# Patient Record
Sex: FEMALE | Race: BLACK OR AFRICAN AMERICAN | NOT HISPANIC OR LATINO | Employment: FULL TIME | URBAN - METROPOLITAN AREA
[De-identification: names, ages, dates, MRNs, and addresses within clinical notes are randomized per-mention and may not be internally consistent; named-entity substitution may affect disease eponyms.]

---

## 2024-11-19 ENCOUNTER — HOSPITAL ENCOUNTER (OUTPATIENT)
Dept: GENERAL RADIOLOGY | Facility: HOSPITAL | Age: 59
Discharge: HOME | End: 2024-11-19
Payer: COMMERCIAL

## 2024-11-19 ENCOUNTER — OFFICE VISIT (OUTPATIENT)
Dept: FAMILY MEDICINE CLINIC | Facility: CLINIC | Age: 59
End: 2024-11-19
Payer: COMMERCIAL

## 2024-11-19 ENCOUNTER — LAB (OUTPATIENT)
Dept: LAB | Facility: HOSPITAL | Age: 59
End: 2024-11-19
Payer: COMMERCIAL

## 2024-11-19 VITALS
OXYGEN SATURATION: 99 % | BODY MASS INDEX: 22.18 KG/M2 | WEIGHT: 138 LBS | HEIGHT: 66 IN | SYSTOLIC BLOOD PRESSURE: 132 MMHG | TEMPERATURE: 98 F | HEART RATE: 85 BPM | DIASTOLIC BLOOD PRESSURE: 88 MMHG

## 2024-11-19 DIAGNOSIS — Z00.00 ENCOUNTER FOR ANNUAL PHYSICAL EXAM: Primary | ICD-10-CM

## 2024-11-19 DIAGNOSIS — R63.4 UNEXPLAINED WEIGHT LOSS: ICD-10-CM

## 2024-11-19 DIAGNOSIS — M54.42 CHRONIC BILATERAL LOW BACK PAIN WITH BILATERAL SCIATICA: ICD-10-CM

## 2024-11-19 DIAGNOSIS — Z01.419 WELL WOMAN EXAM: ICD-10-CM

## 2024-11-19 DIAGNOSIS — G89.29 CHRONIC BILATERAL LOW BACK PAIN WITH BILATERAL SCIATICA: ICD-10-CM

## 2024-11-19 DIAGNOSIS — M54.41 CHRONIC BILATERAL LOW BACK PAIN WITH BILATERAL SCIATICA: ICD-10-CM

## 2024-11-19 DIAGNOSIS — Z11.59 NEED FOR HEPATITIS C SCREENING TEST: ICD-10-CM

## 2024-11-19 DIAGNOSIS — M25.552 LEFT HIP PAIN: ICD-10-CM

## 2024-11-19 DIAGNOSIS — K21.9 GASTROESOPHAGEAL REFLUX DISEASE, UNSPECIFIED WHETHER ESOPHAGITIS PRESENT: ICD-10-CM

## 2024-11-19 DIAGNOSIS — D64.9 ANEMIA, UNSPECIFIED TYPE: ICD-10-CM

## 2024-11-19 DIAGNOSIS — Z00.00 ENCOUNTER FOR ANNUAL PHYSICAL EXAM: ICD-10-CM

## 2024-11-19 DIAGNOSIS — Z12.31 ENCOUNTER FOR SCREENING MAMMOGRAM FOR MALIGNANT NEOPLASM OF BREAST: ICD-10-CM

## 2024-11-19 LAB
25(OH)D3 SERPL-MCNC: 15.6 NG/ML (ref 30–100)
BASOPHILS # BLD AUTO: 0.05 10*3/MM3 (ref 0–0.2)
BASOPHILS NFR BLD AUTO: 0.6 % (ref 0–1.5)
DEPRECATED RDW RBC AUTO: 39 FL (ref 37–54)
EOSINOPHIL # BLD AUTO: 0.2 10*3/MM3 (ref 0–0.4)
EOSINOPHIL NFR BLD AUTO: 2.3 % (ref 0.3–6.2)
ERYTHROCYTE [DISTWIDTH] IN BLOOD BY AUTOMATED COUNT: 12 % (ref 12.3–15.4)
ERYTHROCYTE [SEDIMENTATION RATE] IN BLOOD: 1 MM/HR (ref 0–30)
HBA1C MFR BLD: 7 % (ref 4.8–5.6)
HCT VFR BLD AUTO: 33.3 % (ref 34–46.6)
HCV AB SER QL: NORMAL
HGB BLD-MCNC: 11 G/DL (ref 12–15.9)
HIV 1+2 AB+HIV1 P24 AG SERPL QL IA: NORMAL
IMM GRANULOCYTES # BLD AUTO: 0.04 10*3/MM3 (ref 0–0.05)
IMM GRANULOCYTES NFR BLD AUTO: 0.5 % (ref 0–0.5)
LYMPHOCYTES # BLD AUTO: 2.66 10*3/MM3 (ref 0.7–3.1)
LYMPHOCYTES NFR BLD AUTO: 30.3 % (ref 19.6–45.3)
MCH RBC QN AUTO: 29.5 PG (ref 26.6–33)
MCHC RBC AUTO-ENTMCNC: 33 G/DL (ref 31.5–35.7)
MCV RBC AUTO: 89.3 FL (ref 79–97)
MONOCYTES # BLD AUTO: 0.39 10*3/MM3 (ref 0.1–0.9)
MONOCYTES NFR BLD AUTO: 4.4 % (ref 5–12)
NEUTROPHILS NFR BLD AUTO: 5.44 10*3/MM3 (ref 1.7–7)
NEUTROPHILS NFR BLD AUTO: 61.9 % (ref 42.7–76)
NRBC BLD AUTO-RTO: 0 /100 WBC (ref 0–0.2)
PLATELET # BLD AUTO: 220 10*3/MM3 (ref 140–450)
PMV BLD AUTO: 11.5 FL (ref 6–12)
RBC # BLD AUTO: 3.73 10*6/MM3 (ref 3.77–5.28)
VIT B12 BLD-MCNC: 366 PG/ML (ref 211–946)
WBC NRBC COR # BLD AUTO: 8.78 10*3/MM3 (ref 3.4–10.8)

## 2024-11-19 PROCEDURE — G0432 EIA HIV-1/HIV-2 SCREEN: HCPCS

## 2024-11-19 PROCEDURE — 80061 LIPID PANEL: CPT

## 2024-11-19 PROCEDURE — 83615 LACTATE (LD) (LDH) ENZYME: CPT

## 2024-11-19 PROCEDURE — 36415 COLL VENOUS BLD VENIPUNCTURE: CPT

## 2024-11-19 PROCEDURE — 82607 VITAMIN B-12: CPT

## 2024-11-19 PROCEDURE — 99386 PREV VISIT NEW AGE 40-64: CPT | Performed by: FAMILY MEDICINE

## 2024-11-19 PROCEDURE — 83013 H PYLORI (C-13) BREATH: CPT

## 2024-11-19 PROCEDURE — 82746 ASSAY OF FOLIC ACID SERUM: CPT

## 2024-11-19 PROCEDURE — 73502 X-RAY EXAM HIP UNI 2-3 VIEWS: CPT

## 2024-11-19 PROCEDURE — 86803 HEPATITIS C AB TEST: CPT

## 2024-11-19 PROCEDURE — 86140 C-REACTIVE PROTEIN: CPT

## 2024-11-19 PROCEDURE — 83540 ASSAY OF IRON: CPT

## 2024-11-19 PROCEDURE — 85652 RBC SED RATE AUTOMATED: CPT

## 2024-11-19 PROCEDURE — 84466 ASSAY OF TRANSFERRIN: CPT

## 2024-11-19 PROCEDURE — 82728 ASSAY OF FERRITIN: CPT

## 2024-11-19 PROCEDURE — 82306 VITAMIN D 25 HYDROXY: CPT

## 2024-11-19 PROCEDURE — 83036 HEMOGLOBIN GLYCOSYLATED A1C: CPT

## 2024-11-19 PROCEDURE — 80050 GENERAL HEALTH PANEL: CPT

## 2024-11-19 PROCEDURE — 99214 OFFICE O/P EST MOD 30 MIN: CPT | Performed by: FAMILY MEDICINE

## 2024-11-19 PROCEDURE — 72100 X-RAY EXAM L-S SPINE 2/3 VWS: CPT

## 2024-11-19 RX ORDER — L. ACIDOPHILUS/BIFID. ANIMALIS 2B CELL
CAPSULE ORAL
COMMUNITY

## 2024-11-19 RX ORDER — DOCUSATE SODIUM 100 MG/1
100 CAPSULE, LIQUID FILLED ORAL 2 TIMES DAILY
COMMUNITY

## 2024-11-19 NOTE — ASSESSMENT & PLAN NOTE
Annual wellness exam completed today. Health Maintenance including immunizations was updated and reflected in the chart. Yearly screening labs were ordered.     Further recommendations to be given once lab data received.     Health advice: healthy food choices with fresh fruits and vegetables, maintain sleep pattern at least 8 hours, avoid texting and distracted driving practices; wear safety belt, engage in regular exercise, maintain healthy weight, use safe sex practices, avoid alcohol and illicit drugs. Maintain immunizations that are up to date. Maintain health maintenance: Colon cancer screening, Mammo, PAP, etc.  Follow up with PCP if struggling with depression or anxiety. Keep regular dental and eye exams. Brush and floss teeth daily.     I suggest they take a daily multivitamin that is age-appropriate (example women's One-A-Day.)  Patient voiced understanding of these instructions.     Follow up Annually for Physical

## 2024-11-19 NOTE — PROGRESS NOTES
Subjective     Chief Complaint  Establish Care and Fall (Had a fall 3 years ago and thinks her left hip pain is from that. Unexplained weight loss went from 221 to 138. )    Subjective          Willow Coburn is a 59 y.o. female who presents today to [facility] for initial evaluation .    HPI:   Fall  Associated symptoms include abdominal pain.       Ms. Coburn is a very pleasant 59 year old female who presents today to establish care with a primary care provider and have a annual physical exam.    She has a past medical history is GERD, carpal tunnel syndrome.      She had a fall about 3 years ago where she fell down some stairs.  She has been having significant lower back pain and left hip pain.  The pain radiates mostly down the left side of her leg to the knee sometimes she has pain on the right leg.  She has numbness and tingling in her lower extremities as well.  No indication of saddle anesthesia or bowel or bladder incontinence.    Additionally, she has had almost 100 lbs weight loss over the last three years. She has intermittent abdominal pain and diarrhea. She had a colonoscopy in her 30s. She denies a known history of polyps then. Her father had a history of leukemia. Sometimes she gets dizzy.       The following portions of the patient's history were reviewed and updated as appropriate: allergies, current medications, past family history, past medical history, past social history, past surgical history and problem list.    Objective     Objective     Allergy:   No Known Allergies     Current Medications:   Current Outpatient Medications   Medication Sig Dispense Refill    docusate sodium (COLACE) 100 MG capsule Take 1 capsule by mouth 2 (Two) Times a Day.      Multiple Vitamins-Minerals (One-A-Day Womens VitaCraves) chewable tablet Chew.       No current facility-administered medications for this visit.       Past Medical History:  Past Medical History:   Diagnosis  "Date    Carpal tunnel syndrome     Uterine fibroid        Past Surgical History:  Past Surgical History:   Procedure Laterality Date    CHOLECYSTECTOMY      HYSTERECTOMY         Social History:  Social History     Socioeconomic History    Marital status: Single   Tobacco Use    Smoking status: Never    Smokeless tobacco: Never   Vaping Use    Vaping status: Never Used   Substance and Sexual Activity    Alcohol use: Not Currently    Drug use: Never    Sexual activity: Not Currently     Partners: Male     Birth control/protection: Hysterectomy       Family History:  Family History   Problem Relation Age of Onset    Leukemia Father     Breast cancer Maternal Grandmother     Cancer Other        Review of Systems:  Review of Systems   Constitutional:  Positive for fatigue and unexpected weight change.   Gastrointestinal:  Positive for abdominal pain and diarrhea. Negative for blood in stool.   Musculoskeletal:  Positive for back pain.       Vital Signs:   /88   Pulse 85   Temp 98 °F (36.7 °C) (Temporal)   Ht 167.6 cm (66\")   Wt 62.6 kg (138 lb)   SpO2 99%   BMI 22.27 kg/m²      Physical Exam:  Physical Exam  Vitals reviewed.   Constitutional:       Appearance: She is not ill-appearing.   Eyes:      Pupils: Pupils are equal, round, and reactive to light.   Cardiovascular:      Rate and Rhythm: Normal rate.      Pulses: Normal pulses.   Pulmonary:      Effort: Pulmonary effort is normal.      Breath sounds: Normal breath sounds.   Neurological:      General: No focal deficit present.      Mental Status: She is alert. Mental status is at baseline.   Psychiatric:         Mood and Affect: Mood normal.         Behavior: Behavior normal.         Thought Content: Thought content normal.         Judgment: Judgment normal.               PHQ-9 Score  PHQ-9 Total Score:      Lab Review  No visits with results within 3 Month(s) from this visit.   Latest known visit with results is:   No results found for any previous " visit.        Radiology Results        Assessment / Plan         Assessment and Plan   Diagnoses and all orders for this visit:    1. Encounter for annual physical exam (Primary)  Assessment & Plan:  Annual wellness exam completed today. Health Maintenance including immunizations was updated and reflected in the chart. Yearly screening labs were ordered.     Further recommendations to be given once lab data received.     Health advice: healthy food choices with fresh fruits and vegetables, maintain sleep pattern at least 8 hours, avoid texting and distracted driving practices; wear safety belt, engage in regular exercise, maintain healthy weight, use safe sex practices, avoid alcohol and illicit drugs. Maintain immunizations that are up to date. Maintain health maintenance: Colon cancer screening, Mammo, PAP, etc.  Follow up with PCP if struggling with depression or anxiety. Keep regular dental and eye exams. Brush and floss teeth daily.     I suggest they take a daily multivitamin that is age-appropriate (example women's One-A-Day.)  Patient voiced understanding of these instructions.     Follow up Annually for Physical       Orders:  -     CBC & Differential; Future  -     Comprehensive Metabolic Panel; Future  -     Hemoglobin A1c; Future  -     Lipid Panel; Future  -     TSH Rfx On Abnormal To Free T4; Future  -     Vitamin D,25-Hydroxy; Future  -     Vitamin B12; Future  -     HIV-1/O/2 Ag/Ab w Reflex; Future  -     Sedimentation rate, automated; Future  -     C-reactive protein; Future    2. Chronic bilateral low back pain with bilateral sciatica  Assessment & Plan:  X-ray ordered for further evaluation, she likely needs an MRI as I have high suspicion for pathology in the lumbar spine.    Orders:  -     XR Spine Lumbar 2 or 3 View; Future  -     XR Hip With or Without Pelvis 2 - 3 View Left; Future    3. Unexplained weight loss  Assessment & Plan:  Significant workup initiated with labs and referral for  imaging etc. for her age-appropriate health maintenance.    Orders:  -     Ambulatory Referral For Screening Colonoscopy  -     Lactate Dehydrogenase; Future    4. Need for hepatitis C screening test  -     Hepatitis C Antibody; Future    5. Encounter for screening mammogram for malignant neoplasm of breast  -     Mammo Screening Digital Tomosynthesis Bilateral With CAD; Future    6. Well woman exam  -     Ambulatory Referral to Gynecology    7. Left hip pain  -     XR Hip With or Without Pelvis 2 - 3 View Left; Future    8. Gastroesophageal reflux disease, unspecified whether esophagitis present  -     H. Pylori Breath Test - Breath, Lung; Future  -     Ambulatory referral for Screening EGD        Discussed possible differential diagnoses, testing, treatment, recommended non-pharmacological interventions, risks, warning signs to monitor for that would indicate need for follow-up in clinic or ER. If no improvement with these regimens or you have new or worsening symptoms follow-up. Patient verbalizes understanding and agreement with plan of care. Denies further needs or concerns.     Patient was given instructions and counseling regarding her condition and for health maintenance advised.    BMI is within normal parameters. No other follow-up for BMI required.      Health Maintenance  Health Maintenance:   Health Maintenance Due   Topic Date Due    COLORECTAL CANCER SCREENING  Never done    TDAP/TD VACCINES (1 - Tdap) Never done    MAMMOGRAM  Never done    ZOSTER VACCINE (1 of 2) Never done    COVID-19 Vaccine (3 - 2024-25 season) 09/01/2024    HEPATITIS C SCREENING  Never done        Meds ordered during this visit  No orders of the defined types were placed in this encounter.      Meds stopped during this visit:  There are no discontinued medications.     Visit Diagnoses    ICD-10-CM ICD-9-CM   1. Encounter for annual physical exam  Z00.00 V70.0   2. Chronic bilateral low back pain with bilateral sciatica  M54.42  724.2    M54.41 724.3    G89.29 338.29   3. Unexplained weight loss  R63.4 783.21   4. Need for hepatitis C screening test  Z11.59 V73.89   5. Encounter for screening mammogram for malignant neoplasm of breast  Z12.31 V76.12   6. Well woman exam  Z01.419 V72.31   7. Left hip pain  M25.552 719.45   8. Gastroesophageal reflux disease, unspecified whether esophagitis present  K21.9 530.81       Patient was given instructions and counseling regarding her condition or for health maintenance advice. Please see specific information pulled into the AVS if appropriate.     Follow Up   Return in about 4 weeks (around 12/17/2024) for followup back pain, weight loss .          This document has been electronically signed by Sondra Valencia DO   November 19, 2024 16:57 EST    Dictated Utilizing Dragon Dictation: Part of this note may be an electronic transcription/translation of spoken language to printed text using the Dragon Dictation System.    Sondra Valencia D.O.  [facility]

## 2024-11-19 NOTE — ASSESSMENT & PLAN NOTE
Significant workup initiated with labs and referral for imaging etc. for her age-appropriate health maintenance.

## 2024-11-19 NOTE — ASSESSMENT & PLAN NOTE
X-ray ordered for further evaluation, she likely needs an MRI as I have high suspicion for pathology in the lumbar spine.

## 2024-11-20 LAB
ALBUMIN SERPL-MCNC: 4.1 G/DL (ref 3.5–5.2)
ALBUMIN/GLOB SERPL: 1.6 G/DL
ALP SERPL-CCNC: 65 U/L (ref 39–117)
ALT SERPL W P-5'-P-CCNC: 14 U/L (ref 1–33)
ANION GAP SERPL CALCULATED.3IONS-SCNC: 10 MMOL/L (ref 5–15)
AST SERPL-CCNC: 17 U/L (ref 1–32)
BILIRUB SERPL-MCNC: 0.5 MG/DL (ref 0–1.2)
BUN SERPL-MCNC: 17 MG/DL (ref 6–20)
BUN/CREAT SERPL: 13.7 (ref 7–25)
CALCIUM SPEC-SCNC: 9.8 MG/DL (ref 8.6–10.5)
CHLORIDE SERPL-SCNC: 106 MMOL/L (ref 98–107)
CHOLEST SERPL-MCNC: 163 MG/DL (ref 0–200)
CO2 SERPL-SCNC: 29 MMOL/L (ref 22–29)
CREAT SERPL-MCNC: 1.24 MG/DL (ref 0.57–1)
CRP SERPL-MCNC: <0.3 MG/DL (ref 0–0.5)
EGFRCR SERPLBLD CKD-EPI 2021: 50.9 ML/MIN/1.73
GLOBULIN UR ELPH-MCNC: 2.6 GM/DL
GLUCOSE SERPL-MCNC: 135 MG/DL (ref 65–99)
HDLC SERPL-MCNC: 38 MG/DL (ref 40–60)
LDH SERPL-CCNC: 213 U/L (ref 135–214)
LDLC SERPL CALC-MCNC: 100 MG/DL (ref 0–100)
LDLC/HDLC SERPL: 2.56 {RATIO}
POTASSIUM SERPL-SCNC: 4.2 MMOL/L (ref 3.5–5.2)
PROT SERPL-MCNC: 6.7 G/DL (ref 6–8.5)
SODIUM SERPL-SCNC: 145 MMOL/L (ref 136–145)
TRIGL SERPL-MCNC: 139 MG/DL (ref 0–150)
TSH SERPL DL<=0.05 MIU/L-ACNC: 1.38 UIU/ML (ref 0.27–4.2)
VLDLC SERPL-MCNC: 25 MG/DL (ref 5–40)

## 2024-11-21 DIAGNOSIS — R79.89 ELEVATED SERUM CREATININE: ICD-10-CM

## 2024-11-21 DIAGNOSIS — D64.9 ANEMIA, UNSPECIFIED TYPE: ICD-10-CM

## 2024-11-21 DIAGNOSIS — E11.9 TYPE 2 DIABETES MELLITUS WITHOUT COMPLICATION, WITHOUT LONG-TERM CURRENT USE OF INSULIN: Primary | ICD-10-CM

## 2024-11-21 LAB
FERRITIN SERPL-MCNC: 605 NG/ML (ref 13–150)
FOLATE SERPL-MCNC: 14.1 NG/ML (ref 4.78–24.2)
IRON 24H UR-MRATE: 108 MCG/DL (ref 37–145)
IRON SATN MFR SERPL: 31 % (ref 20–50)
TIBC SERPL-MCNC: 346 MCG/DL (ref 298–536)
TRANSFERRIN SERPL-MCNC: 232 MG/DL (ref 200–360)
UREA BREATH TEST QL: NEGATIVE

## 2024-11-21 RX ORDER — ERGOCALCIFEROL 1.25 MG/1
50000 CAPSULE, LIQUID FILLED ORAL WEEKLY
Qty: 12 CAPSULE | Refills: 0 | Status: SHIPPED | OUTPATIENT
Start: 2024-11-21

## 2024-11-21 RX ORDER — BLOOD-GLUCOSE METER
1 KIT MISCELLANEOUS ONCE
Qty: 1 EACH | Refills: 0 | Status: SHIPPED | OUTPATIENT
Start: 2024-11-21 | End: 2024-11-21

## 2024-11-21 RX ORDER — GLUCOSAMINE HCL/CHONDROITIN SU 500-400 MG
1 CAPSULE ORAL 2 TIMES DAILY
Qty: 100 EACH | Refills: 11 | Status: SHIPPED | OUTPATIENT
Start: 2024-11-21

## 2024-11-22 ENCOUNTER — TELEPHONE (OUTPATIENT)
Dept: FAMILY MEDICINE CLINIC | Facility: CLINIC | Age: 59
End: 2024-11-22
Payer: COMMERCIAL

## 2024-11-22 NOTE — TELEPHONE ENCOUNTER
Caller: Willow Coburn    Relationship: Self    Best call back number:    [phone] (Home)     What medication are you requesting:     IRON MEDICATION    PATIENT STATED FOLLOWING APPOINTMENT WITH DR JOHN, AN IRON PRESCRIPTION WAS TO BE FORWARDED TO PHARMACY INCLUDED BELOW    PATIENT STATED PHARMACY HAS NOT RECEIVED THE IRON PRESCRIPTION FROM DR JOHN YET    If a prescription is needed, what is your preferred pharmacy and phone number:     [facility] [id number] - [place] [address] AT [facility] [address] - [phone]  - [phone] FX

## 2024-11-25 ENCOUNTER — TELEPHONE (OUTPATIENT)
Dept: FAMILY MEDICINE CLINIC | Facility: CLINIC | Age: 59
End: 2024-11-25

## 2024-11-25 DIAGNOSIS — M43.17 ANTEROLISTHESIS OF LUMBOSACRAL SPINE: Primary | ICD-10-CM

## 2024-11-25 DIAGNOSIS — M16.10 PRIMARY OSTEOARTHRITIS OF HIP, UNSPECIFIED LATERALITY: ICD-10-CM

## 2024-11-25 NOTE — TELEPHONE ENCOUNTER
Caller: Willow Coburn    Relationship: Self    Best call back number: [phone]      What test was performed: MRI ON LOWER BACK LEFT SIDE AND HIP     When was the test performed: 11/19/2024      Additional notes: THE PATIENT WOULD LIKE TO KNOW IF THE RESULTS ARE IN FOR HER MRI AND SHE WOULD LIKE A NURSE TO CALL HER TO DISCUSS THE RESULTS     THE PATIENT WOULD ALSO LIKE TO KNOW IF HER IRON MEDICATION WAS CALLED IN

## 2024-11-25 NOTE — TELEPHONE ENCOUNTER
HUB TO RELAY    LVM. Please let patient know Dr. Valencia said her iron studies are normal so iron supplementation is not needed at this time. As for MRI, we have note received the results yet as it was just done today. It may take up to 5 days to receive.

## 2024-11-27 ENCOUNTER — TELEPHONE (OUTPATIENT)
Dept: FAMILY MEDICINE CLINIC | Facility: CLINIC | Age: 59
End: 2024-11-27
Payer: COMMERCIAL

## 2024-11-27 ENCOUNTER — HOSPITAL ENCOUNTER (OUTPATIENT)
Dept: MAMMOGRAPHY | Facility: HOSPITAL | Age: 59
Discharge: HOME | End: 2024-11-27
Admitting: FAMILY MEDICINE
Payer: COMMERCIAL

## 2024-11-27 ENCOUNTER — TELEPHONE (OUTPATIENT)
Dept: GASTROENTEROLOGY | Facility: CLINIC | Age: 59
End: 2024-11-27
Payer: COMMERCIAL

## 2024-11-27 DIAGNOSIS — Z12.31 ENCOUNTER FOR SCREENING MAMMOGRAM FOR MALIGNANT NEOPLASM OF BREAST: ICD-10-CM

## 2024-11-27 LAB
NCCN CRITERIA FLAG: NORMAL
TYRER CUZICK SCORE: 8.4

## 2024-11-27 PROCEDURE — 77067 SCR MAMMO BI INCL CAD: CPT

## 2024-11-27 PROCEDURE — 77063 BREAST TOMOSYNTHESIS BI: CPT

## 2024-11-27 RX ORDER — SODIUM PICOSULFATE, MAGNESIUM OXIDE, AND ANHYDROUS CITRIC ACID 12; 3.5; 1 G/175ML; G/175ML; MG/175ML
350 LIQUID ORAL TAKE AS DIRECTED
Qty: 350 ML | Refills: 0 | Status: SHIPPED | OUTPATIENT
Start: 2024-11-27 | End: 2024-11-28

## 2024-11-27 NOTE — TELEPHONE ENCOUNTER
PT HAS COLON W/ EGD ON 12/4 NEEDS BOWEL PREP CALLED IN PLEASE, TO:    [facility] [id number] - [place], [place] - [facility] [place]  AT [facility] [address] - [phone]  - [phone] FX

## 2024-12-02 RX ORDER — SODIUM, POTASSIUM,MAG SULFATES 17.5-3.13G
1 SOLUTION, RECONSTITUTED, ORAL ORAL TAKE AS DIRECTED
Qty: 354 ML | Refills: 0 | Status: SHIPPED | OUTPATIENT
Start: 2024-12-02

## 2024-12-02 NOTE — TELEPHONE ENCOUNTER
Name: Willow Coburn      Relationship: Self      Best Callback Number: [phone] (home)        HUB PROVIDED THE RELAY MESSAGE FROM THE OFFICE      PATIENT: VOICED UNDERSTANDING AND HAS NO FURTHER QUESTIONS AT THIS TIME    ADDITIONAL INFORMATION:

## 2024-12-02 NOTE — TELEPHONE ENCOUNTER
HUB TO RELAY    LVM. Per Dr. Valencia:   I did not refer to pain management. I referred to Ortho due to severe OA in the hip. I also ordered an MRI of the lumbar spine due to degenerative findings in the lumbar spine on xray   She will defer to orthopedics to see how they want to treat.

## 2024-12-05 ENCOUNTER — OUTSIDE FACILITY SERVICE (OUTPATIENT)
Dept: GASTROENTEROLOGY | Facility: CLINIC | Age: 59
End: 2024-12-05
Payer: COMMERCIAL

## 2024-12-05 ENCOUNTER — OFFICE VISIT (OUTPATIENT)
Dept: ORTHOPEDIC SURGERY | Facility: CLINIC | Age: 59
End: 2024-12-05
Payer: COMMERCIAL

## 2024-12-05 VITALS
BODY MASS INDEX: 22.94 KG/M2 | HEIGHT: 64 IN | SYSTOLIC BLOOD PRESSURE: 140 MMHG | DIASTOLIC BLOOD PRESSURE: 90 MMHG | WEIGHT: 134.4 LBS

## 2024-12-05 DIAGNOSIS — M54.16 RADICULOPATHY, LUMBAR REGION: Primary | ICD-10-CM

## 2024-12-05 DIAGNOSIS — M16.12 PRIMARY OSTEOARTHRITIS OF LEFT HIP: ICD-10-CM

## 2024-12-05 DIAGNOSIS — R63.4 WEIGHT LOSS, ABNORMAL: Primary | ICD-10-CM

## 2024-12-05 PROCEDURE — 99204 OFFICE O/P NEW MOD 45 MIN: CPT | Performed by: ORTHOPAEDIC SURGERY

## 2024-12-05 PROCEDURE — 88305 TISSUE EXAM BY PATHOLOGIST: CPT | Performed by: INTERNAL MEDICINE

## 2024-12-05 PROCEDURE — 45385 COLONOSCOPY W/LESION REMOVAL: CPT | Performed by: INTERNAL MEDICINE

## 2024-12-05 PROCEDURE — 43239 EGD BIOPSY SINGLE/MULTIPLE: CPT | Performed by: INTERNAL MEDICINE

## 2024-12-05 RX ORDER — METHYLPREDNISOLONE 4 MG/1
TABLET ORAL
Qty: 21 TABLET | Refills: 0 | Status: SHIPPED | OUTPATIENT
Start: 2024-12-05

## 2024-12-05 NOTE — LETTER
December 5, 2024     Sondra Valencia DO  [address] [place] [id number]    Patient: Willow Coburn   YOB: 1965   Date of Visit: 12/5/2024       Dear Sondra Valencia DO:    Thank you for referring Willow Coburn to me for evaluation. Below are the relevant portions of my assessment and plan of care.    Encounter Diagnosis and Orders:  Diagnoses and all orders for this visit:    1. Radiculopathy, lumbar region (Primary)  -     methylPREDNISolone (MEDROL) 4 MG dose pack; Use as directed by package instructions.  Dispense: 21 tablet; Refill: 0    2. Primary osteoarthritis of left hip        If you have questions, please do not hesitate to call me. I look forward to following Willow along with you.         Sincerely,        Federico Jackson MD        CC: No Recipients

## 2024-12-05 NOTE — PROGRESS NOTES
Orthopaedic Clinic Note: Hip New Patient    Chief Complaint   Patient presents with    Left Hip - Pain        HPI    Willow Coburn is a 59 y.o. female who presents with left hip pain for 8 month(s). Onset mechanical fall. Pain is localized to gluteal region and is radiating down the leg and is a 9/10 on the pain scale.Pain is described as aching, burning, and stabbing. Associated symptoms include pain, stiffness, giving way/buckling, and tingling .  The pain is worse with walking, climbing stairs, sleeping, working, and lying on affected side; pain medication and/or NSAID improve the pain. Previous treatments have included: NSAIDS. Although some transient relief was reported with these interventions, these conservative measures have failed and symptoms have persisted. The patient is limited in daily activities and has had a significant decrease in quality of life as a result. She denies fevers, chills, or constitutional symptoms.    I have reviewed the following portions of the patient's history:History of Present Illness    Past Medical History:   Diagnosis Date    Carpal tunnel syndrome     Uterine fibroid       Past Surgical History:   Procedure Laterality Date    BREAST BIOPSY Left     needle biopsy    CHOLECYSTECTOMY      HYSTERECTOMY        Family History   Problem Relation Age of Onset    Leukemia Father     Breast cancer Niece     Cancer Niece     Ovarian cancer Neg Hx      Social History     Socioeconomic History    Marital status: Single   Tobacco Use    Smoking status: Never    Smokeless tobacco: Never   Vaping Use    Vaping status: Never Used   Substance and Sexual Activity    Alcohol use: Not Currently    Drug use: Never    Sexual activity: Not Currently     Partners: Male     Birth control/protection: Hysterectomy      Current Outpatient Medications on File Prior to Visit   Medication Sig Dispense Refill    docusate sodium (COLACE) 100 MG capsule Take 1 capsule by mouth 2 (Two) Times a Day.       "Glucose Blood (Blood Glucose Test) strip Inject 1 each under the skin into the appropriate area as directed 2 (Two) Times a Day. 100 each 11    Lancets Micro Thin 33G misc Inject 1 each under the skin into the appropriate area as directed Daily. 100 each 11    metFORMIN (GLUCOPHAGE) 500 MG tablet Take 1 tablet by mouth 2 (Two) Times a Day With Meals. 180 tablet 1    Multiple Vitamins-Minerals (One-A-Day Womens VitaCraves) chewable tablet Chew.      sodium-potassium-magnesium sulfates (Suprep Bowel Prep Kit) 17.5-3.13-1.6 GM/177ML solution oral solution Take 1 bottle by mouth Take As Directed. 354 mL 0    vitamin D (ERGOCALCIFEROL) 1.25 MG (21767 UT) capsule capsule Take 1 capsule by mouth 1 (One) Time Per Week. 12 capsule 0     No current facility-administered medications on file prior to visit.      No Known Allergies     Review of Systems   Constitutional: Negative.    HENT: Negative.     Eyes: Negative.    Respiratory: Negative.     Cardiovascular: Negative.    Gastrointestinal: Negative.    Endocrine: Negative.    Genitourinary: Negative.    Musculoskeletal:  Positive for arthralgias.   Skin: Negative.    Allergic/Immunologic: Negative.    Neurological: Negative.    Hematological: Negative.    Psychiatric/Behavioral: Negative.          The patient's Review of Systems was personally reviewed and confirmed as accurate.    The following portions of the patient's history were reviewed and updated as appropriate: allergies, current medications, past family history, past medical history, past social history, past surgical history, and problem list.    Physical Exam  Blood pressure 140/90, height 162.6 cm (64\"), weight 61 kg (134 lb 6.4 oz).    Body mass index is 23.07 kg/m².    GENERAL APPEARANCE: awake, alert & oriented x 3, in no acute distress and well developed, well nourished  PSYCH: normal affect  LUNGS:  breathing nonlabored  EYES: sclera anicteric  CARDIOVASCULAR: palpable dorsalis pedis, palpable posterior " tibial bilaterally. Capillary refill less than 2 seconds  EXTREMITIES: no clubbing, cyanosis  GAIT:  Normal           Right Hip Exam:  RANGE OF MOTION:   FLEXION CONTRACTURE: None   FLEXION: 110 degrees   INTERNAL ROTATION: 20 degrees at 90 degrees of flexion   EXTERNAL ROTATION: 40 degrees at 90 degrees of flexion    PAIN WITH HIP MOTION: no  PAIN WITH LOGROLL: no  STINCHFIELD TEST: negative    KNEE EXAM: full knee ROM (0-120 degrees), stable to varus and valgus stress at terminal extension and 30 degrees flexion     STRENGTH:  5/5 hip adduction, abduction, flexion. 5/5 strength knee flexion, extension. 5/5 strength ankle dorsiflexion and plantarflexion.     GREATER TROCHANTER BURSAL PAIN:  no     REFLEXES:   PATELLAR 2+/4   ACHILLES 2+/4    CLONUS: negative  STRAIGHT LEG TEST:   negative    SENSATION TO LIGHT TOUCH:  DEEP PERONEAL/SUPERFICIAL PERONEAL/SURAL/SAPHENOUS/TIBIAL:  intact    EDEMA:   no  ERYTHEMA:  no  WOUNDS/INCISIONS: no overlying skin problems.        Left Hip Exam:   RANGE OF MOTION:   FLEXION CONTRACTURE: None   FLEXION: 110 degrees   INTERNAL ROTATION: 15 degrees at 90 degrees of flexion   EXTERNAL ROTATION: 40 degrees at 90 degrees of flexion    PAIN WITH HIP MOTION: no  PAIN WITH LOGROLL: no  STINCHFIELD TEST: negative    KNEE EXAM: full knee ROM (0-120 degrees), stable to varus and valgus stress at terminal extension and 30 degrees flexion     STRENGTH:  5/5 hip adduction, abduction, flexion. 5/5 strength knee flexion, extension. 5/5 strength ankle dorsiflexion and plantarflexion.     GREATER TROCHANTER BURSAL PAIN:  no  Tender palpation about gluteal musculature and lumbar paraspinal musculature/SI joint region     REFLEXES:   PATELLAR 2+/4   ACHILLES 2+/4    CLONUS: negative  STRAIGHT LEG TEST:   negative    SENSATION TO LIGHT TOUCH:  DEEP PERONEAL/SUPERFICIAL PERONEAL/SURAL/SAPHENOUS/TIBIAL:  intact suffered paresthesias in the L5 distribution    EDEMA:   no  ERYTHEMA:  no  WOUNDS/INCISIONS:  no overlying skin problems.      ------------------------------------------------------------------    LEG LENGTHS:  equal  _____________________________________________________  _____________________________________________________    RADIOGRAPHIC FINDINGS:   Radiographs of the left hip from 11/19/2024 were personally reviewed and interpreted.  Radiographs demonstrate moderate to severe arthritis of the left hip with significant osteophyte formation.  There is some joint space remaining.    Labs from 11/19/2024 reveal creatinine of 1.24, GFR 50.9, A1c 7.0.      Assessment/Plan:   Diagnosis Plan   1. Radiculopathy, lumbar region  methylPREDNISolone (MEDROL) 4 MG dose pack      2. Primary osteoarthritis of left hip          I discussed with the patient that despite her radiographic evidence of moderate to severe hip arthritis, her exam demonstrates no pain with hip range of motion.  She is suffering from lumbar radiculopathy primarily.  I recommended patient be evaluated by a spine specialist or pain management to discuss treatment for her ongoing back issues.  To help with her radicular pain at this point, I will prescribe her a short course of oral steroids.  Medrol Dosepak prescribed.  She will follow-up as needed    Federico Jackson MD  12/05/24  10:10 EST   p/w elevated blood pressure, lightheadedness and palpitation  hx of HTN on Losartan 100mg, Metoprolol succ 25 mg, Amlodipine 25 mg   in ED: afebrile, HR 80, /89, Sat 99% on RA  s/p   EKG: SR, T wave inversion in lead III, Q waves, LVH  Trop x2 neg  Pro-BNP neg  c/w tele and vitals q4  c/w Losartan, Metoprolol tart 12.5 mg, Amlodipine with holding parameters and adjust according to BP   monitor BP   Cardiology . ??? p/w elevated blood pressure, lightheadedness and palpitation  in ED: afebrile, HR 80, /89, Sat 99% on RA  s/p   EKG: SR, T wave inversion in lead III, Q waves, LVH  Trop x2 neg  c/w tele and vitals q4  c/w ASA 81 and Atorvastatin 80 mg   f/u Echo  Cardio Dr. Alvarez consulted p/w elevated blood pressure, lightheadedness and palpitation  in ED: afebrile, HR 80, /89, Sat 99% on RA  s/p   EKG: SR, T wave inversion in lead III, Q waves, LVH  Trop x2 neg  c/w tele and vitals q4  c/w ASA 81 and Atorvastatin 80 mg   f/u Echo  Cardio Dr. Bui consulted

## 2024-12-06 ENCOUNTER — LAB REQUISITION (OUTPATIENT)
Dept: LAB | Facility: HOSPITAL | Age: 59
End: 2024-12-06
Payer: COMMERCIAL

## 2024-12-06 DIAGNOSIS — K21.9 GASTRO-ESOPHAGEAL REFLUX DISEASE WITHOUT ESOPHAGITIS: ICD-10-CM

## 2024-12-06 DIAGNOSIS — D12.2 BENIGN NEOPLASM OF ASCENDING COLON: ICD-10-CM

## 2024-12-06 DIAGNOSIS — Z12.11 ENCOUNTER FOR SCREENING FOR MALIGNANT NEOPLASM OF COLON: ICD-10-CM

## 2024-12-06 DIAGNOSIS — D12.3 BENIGN NEOPLASM OF TRANSVERSE COLON: ICD-10-CM

## 2024-12-06 DIAGNOSIS — K57.30 DIVERTICULOSIS OF LARGE INTESTINE WITHOUT PERFORATION OR ABSCESS WITHOUT BLEEDING: ICD-10-CM

## 2024-12-09 LAB — REF LAB TEST METHOD: NORMAL

## 2024-12-11 NOTE — PROGRESS NOTES
Called patient and went over results with patient. She understood and she states she will waiting on scheduling to call her to schedule her CT and once it is scheduled she will call into are office to make a f/u with .

## 2024-12-12 ENCOUNTER — HOSPITAL ENCOUNTER (OUTPATIENT)
Dept: MRI IMAGING | Facility: HOSPITAL | Age: 59
Discharge: HOME | End: 2024-12-12
Admitting: FAMILY MEDICINE
Payer: COMMERCIAL

## 2024-12-12 DIAGNOSIS — M43.17 ANTEROLISTHESIS OF LUMBOSACRAL SPINE: ICD-10-CM

## 2024-12-12 PROCEDURE — 72148 MRI LUMBAR SPINE W/O DYE: CPT

## 2024-12-13 ENCOUNTER — TELEPHONE (OUTPATIENT)
Dept: FAMILY MEDICINE CLINIC | Facility: CLINIC | Age: 59
End: 2024-12-13
Payer: COMMERCIAL

## 2024-12-13 DIAGNOSIS — M43.17 ANTEROLISTHESIS OF LUMBOSACRAL SPINE: Primary | ICD-10-CM

## 2024-12-13 DIAGNOSIS — M54.17 LUMBOSACRAL RADICULOPATHY: ICD-10-CM

## 2024-12-13 NOTE — TELEPHONE ENCOUNTER
Caller: Willow Coburn    Relationship: Self    Best call back number:       [phone] (Home)     Caller requesting test results:     PATIENT    What test was performed:     MRI OF L 4 AND 5    When was the test performed:     YESTERDAY 12/12    [facility] the [facility]    Additional notes:     PATIENT REQUESTED A CALL BACK ONCE TEST RESULTS HAVE RETURNED AND DR JOHN HAS A CHANCE TO REVIEW

## 2024-12-16 ENCOUNTER — HOSPITAL ENCOUNTER (OUTPATIENT)
Dept: DIABETES SERVICES | Facility: HOSPITAL | Age: 59
Setting detail: RECURRING SERIES
Discharge: HOME | End: 2024-12-16
Payer: COMMERCIAL

## 2024-12-16 PROCEDURE — G0109 DIAB MANAGE TRN IND/GROUP: HCPCS

## 2024-12-16 NOTE — CONSULTS
Patient attended the scheduled 2 hour diabetes education class. Please see media tab for assessment and notes if you use [employer]. If you are not an [employer] user a copy of patient's assessment and notes will be sent per routine. Thank you.

## 2024-12-18 ENCOUNTER — TELEPHONE (OUTPATIENT)
Dept: GASTROENTEROLOGY | Facility: CLINIC | Age: 59
End: 2024-12-18

## 2024-12-18 NOTE — TELEPHONE ENCOUNTER
Caller: MARY ALICE MCCULLOUGH    Relationship to patient: SELF    Best call back number: [phone]    Patient is needing: PT STATED DR. BARBA ASKED HER TO NOTIFY HER WHEN CT SCAN WAS SCHEDULED. CT SCAN SCHEDULED FOR 12.22 AT 2:00PM.

## 2024-12-22 ENCOUNTER — HOSPITAL ENCOUNTER (OUTPATIENT)
Dept: CT IMAGING | Facility: HOSPITAL | Age: 59
Discharge: HOME | End: 2024-12-22
Admitting: INTERNAL MEDICINE
Payer: COMMERCIAL

## 2024-12-22 DIAGNOSIS — R63.4 WEIGHT LOSS, ABNORMAL: ICD-10-CM

## 2024-12-22 PROCEDURE — 25510000001 IOPAMIDOL 61 % SOLUTION: Performed by: INTERNAL MEDICINE

## 2024-12-22 PROCEDURE — 74178 CT ABD&PLV WO CNTR FLWD CNTR: CPT

## 2024-12-22 PROCEDURE — 71250 CT THORAX DX C-: CPT

## 2024-12-22 RX ORDER — IOPAMIDOL 612 MG/ML
100 INJECTION, SOLUTION INTRAVASCULAR
Status: COMPLETED | OUTPATIENT
Start: 2024-12-22 | End: 2024-12-22

## 2024-12-22 RX ADMIN — IOPAMIDOL 80 ML: 612 INJECTION, SOLUTION INTRAVENOUS at 13:46

## 2024-12-30 ENCOUNTER — TELEPHONE (OUTPATIENT)
Dept: GASTROENTEROLOGY | Facility: CLINIC | Age: 59
End: 2024-12-30
Payer: COMMERCIAL

## 2024-12-30 NOTE — TELEPHONE ENCOUNTER
[facility] - HUB CAN RELAY MESSAGE.  Barbara is out until next week once she comes back she will review and we will reach out to patient about results.

## 2024-12-31 ENCOUNTER — TRANSCRIBE ORDERS (OUTPATIENT)
Dept: MAMMOGRAPHY | Facility: HOSPITAL | Age: 59
End: 2024-12-31
Payer: COMMERCIAL

## 2024-12-31 ENCOUNTER — HOSPITAL ENCOUNTER (OUTPATIENT)
Dept: MAMMOGRAPHY | Facility: HOSPITAL | Age: 59
Discharge: HOME | End: 2024-12-31
Admitting: RADIOLOGY
Payer: COMMERCIAL

## 2024-12-31 DIAGNOSIS — R92.8 ABNORMAL MAMMOGRAM: ICD-10-CM

## 2024-12-31 DIAGNOSIS — R92.8 ABNORMAL MAMMOGRAM: Primary | ICD-10-CM

## 2024-12-31 PROCEDURE — 77066 DX MAMMO INCL CAD BI: CPT | Performed by: RADIOLOGY

## 2024-12-31 PROCEDURE — 77062 BREAST TOMOSYNTHESIS BI: CPT | Performed by: RADIOLOGY

## 2024-12-31 PROCEDURE — 77066 DX MAMMO INCL CAD BI: CPT

## 2024-12-31 PROCEDURE — G0279 TOMOSYNTHESIS, MAMMO: HCPCS

## 2025-01-05 ENCOUNTER — TELEPHONE (OUTPATIENT)
Dept: PAIN MEDICINE | Facility: CLINIC | Age: 60
End: 2025-01-05
Payer: COMMERCIAL

## 2025-01-06 NOTE — TELEPHONE ENCOUNTER
Unable to send [employer] message.   LVM that appointment was canceled and someone would be calling to r/s

## 2025-01-07 ENCOUNTER — TELEPHONE (OUTPATIENT)
Dept: PAIN MEDICINE | Facility: CLINIC | Age: 60
End: 2025-01-07
Payer: COMMERCIAL

## 2025-01-09 ENCOUNTER — HOSPITAL ENCOUNTER (OUTPATIENT)
Dept: MAMMOGRAPHY | Facility: HOSPITAL | Age: 60
Discharge: HOME | End: 2025-01-09
Payer: COMMERCIAL

## 2025-01-09 DIAGNOSIS — R92.8 ABNORMAL MAMMOGRAM: ICD-10-CM

## 2025-01-09 PROCEDURE — 77065 DX MAMMO INCL CAD UNI: CPT

## 2025-01-09 PROCEDURE — 19081 BX BREAST 1ST LESION STRTCTC: CPT

## 2025-01-13 ENCOUNTER — OFFICE VISIT (OUTPATIENT)
Dept: PAIN MEDICINE | Facility: CLINIC | Age: 60
End: 2025-01-13
Payer: COMMERCIAL

## 2025-01-13 VITALS — HEIGHT: 64 IN | BODY MASS INDEX: 22.93 KG/M2 | WEIGHT: 134.3 LBS

## 2025-01-13 DIAGNOSIS — M53.3 PAIN OF LEFT SACROILIAC JOINT: Primary | ICD-10-CM

## 2025-01-13 DIAGNOSIS — M53.3 SACROILIAC JOINT PAIN: Primary | ICD-10-CM

## 2025-01-13 DIAGNOSIS — E11.9 TYPE 2 DIABETES MELLITUS WITHOUT COMPLICATION, WITHOUT LONG-TERM CURRENT USE OF INSULIN: ICD-10-CM

## 2025-01-13 DIAGNOSIS — M53.3 SACROILIAC JOINT PAIN: ICD-10-CM

## 2025-01-13 PROCEDURE — 99204 OFFICE O/P NEW MOD 45 MIN: CPT | Performed by: STUDENT IN AN ORGANIZED HEALTH CARE EDUCATION/TRAINING PROGRAM

## 2025-01-13 RX ORDER — ERGOCALCIFEROL 1.25 MG/1
50000 CAPSULE, LIQUID FILLED ORAL WEEKLY
Qty: 12 CAPSULE | Refills: 0 | Status: SHIPPED | OUTPATIENT
Start: 2025-01-13

## 2025-01-13 NOTE — PROGRESS NOTES
Referring Physician: Sondra Valencia DO  [address] [place] [address]    Primary Physician: Sondra Valencia DO    CHIEF COMPLAINT or REASON FOR VISIT: No chief complaint on file.      Initial history of present illness on 01/13/2025:  Ms. Willow Coburn is 59 y.o. female who presents as a new patient referral for evaluation treatment of chronic axial low back pain with radiation to the left buttock.  Patient identifies a focal source of pain at the left lumbosacral junction.  This started approximately 8 months ago.  Over that timeframe she has additionally had some significant weight loss of approximately 100 pounds without any known cause.  She believes this may be secondary to a recent diagnosis of diverticulitis.  This is being monitored and managed by her primary care physician Dr. Valencia.  She has undergone GI workup, scopes etc.  She denies any history of spinal surgery or intervention.  She denies any history of physical therapy.  She has trialed acetaminophen with mild benefit, NSAIDs with modest benefit.  Patient denies any lower extremity motor dysfunction or radiation beyond the buttock.  She denies any saddle anesthesia.  She does endorse unexplained weight loss.  She additionally complains of bilateral periscapular thoracic back pain.    Interval history:    Interventions:      Objective Pain Scoring:   BRIEF PAIN INVENTORY:  Total score:   Pain Score    01/13/25 0836   PainSc:   4      PHQ-2: 3  PHQ-9: 6  Opioid Risk Tool:         Review of Systems:   ROS negative except as otherwise noted     Past Medical History:   Past Medical History:   Diagnosis Date    Carpal tunnel syndrome     Uterine fibroid          Past Surgical History:   Past Surgical History:   Procedure Laterality Date    BREAST BIOPSY Left     needle biopsy    CHOLECYSTECTOMY      HYSTERECTOMY           Family History   Family History   Problem Relation Age of Onset    Leukemia Father     Breast cancer Niece      "Cancer Niece     Ovarian cancer Neg Hx          Social History   Social History     Socioeconomic History    Marital status: Single   Tobacco Use    Smoking status: Never    Smokeless tobacco: Never   Vaping Use    Vaping status: Never Used   Substance and Sexual Activity    Alcohol use: Not Currently    Drug use: Never    Sexual activity: Not Currently     Partners: Male     Birth control/protection: Hysterectomy        Medications:     Current Outpatient Medications:     docusate sodium (COLACE) 100 MG capsule, Take 1 capsule by mouth 2 (Two) Times a Day., Disp: , Rfl:     Glucose Blood (Blood Glucose Test) strip, Inject 1 each under the skin into the appropriate area as directed 2 (Two) Times a Day., Disp: 100 each, Rfl: 11    Lancets Micro Thin 33G misc, Inject 1 each under the skin into the appropriate area as directed Daily., Disp: 100 each, Rfl: 11    metFORMIN (GLUCOPHAGE) 500 MG tablet, Take 1 tablet by mouth 2 (Two) Times a Day With Meals., Disp: 180 tablet, Rfl: 1    Multiple Vitamins-Minerals (One-A-Day Womens VitaCraves) chewable tablet, Chew., Disp: , Rfl:     vitamin D (ERGOCALCIFEROL) 1.25 MG (54636 UT) capsule capsule, Take 1 capsule by mouth 1 (One) Time Per Week., Disp: 12 capsule, Rfl: 0    methylPREDNISolone (MEDROL) 4 MG dose pack, Use as directed by package instructions., Disp: 21 tablet, Rfl: 0    sodium-potassium-magnesium sulfates (Suprep Bowel Prep Kit) 17.5-3.13-1.6 GM/177ML solution oral solution, Take 1 bottle by mouth Take As Directed., Disp: 354 mL, Rfl: 0        Physical Exam:     Vitals:    01/13/25 0836   Weight: 60.9 kg (134 lb 4.8 oz)   Height: 162.6 cm (64\")   PainSc:   4        General: Alert and oriented, No acute distress.   HEENT: Normocephalic, atraumatic.   Cardiovascular: No gross edema  Respiratory: Respirations are non-labored    Cervical Spine:   No masses or atrophy  Range of motion - Flexion normal. Extension normal. Lateral rotation normal.   Palpation - nontender "   Spurling's - negative     Thoracic Spine:   Inspection: no gross abnormality  Paraspinal muscle palpation: Tender bilaterally  Spinous process palpation: nontender    Lumbar Spine:   No masses or atrophy  Range of motion - Flexion normal. Extension normal.    Facet Loading: Negative bilaterally  Facet Palpation - Nontender   Peña finger/Gaenslen's/Cooper's/HARRY/Thigh thrust -positive left  Straight leg raise/slump test: Negative bilaterally  Multifidus toe-touch test:  TTP left piriformis  Negative FADIR test    Motor Exam:    Strength: Rate on 1-5 scale Right Left    C5-Elbow flexion, Deltoid 5/5  5/5    C6-Wrist extension 5/5  5/5    C7- Elbow / finger extension 5/5  5/5    C8- Finger flexion 5/5  5/5    T1- Intrinsics hand 5/5  5/5    Strength: Rate on 1-5 scale Right Left    L1/2- hip flexion 5/5  5/5    L3- knee extension 5/5  5/5    L4- ankle dorsiflexion 5/5  5/5    L5- great toe extension 5/5  5/5    S1- ankle plantarflexion 5/5  5/5    Sensory Exam: Full and equal sensation to light touch throughout.       Neurologic: Cranial Nerves II-XII are grossly intact.     Psychiatric: Cooperative.   Gait: Normal   Assistive Devices: None    Imaging Studies:   Results for orders placed during the hospital encounter of 12/12/24    MRI Lumbar Spine Without Contrast    Narrative  MRI LUMBAR SPINE WO CONTRAST    Date of Exam: 12/12/2024 3:41 PM EST    Indication: anterolisthesis.    Comparison: None available.    Technique:  Routine multiplanar/multisequence sequence images of the lumbar spine were obtained without contrast administration.    Findings: Lumbar vertebral bodies are normal normal. Disc space narrowing at L5-S1. Heterogeneous appearance of the bone marrow likely due to demineralization. No marrow edema. Benign hemangioma in the S1. Multiple T2 hyperintensities involving both  kidneys consistent with small renal cysts. Colonic diverticulosis without evidence of diverticulitis. The conus  medullaris terminates at the L2 vertebral body level. No cord signal abnormalities.    T12-L1: No significant spinal canal or foraminal narrowing.    L1-L2: No significant spinal canal or foraminal narrowing.    L2-L3: Disc bulge. Minimal canal stenosis. Minimal foraminal narrowing.    L3-L4: Disc bulge. Minimal canal stenosis. Minimal foraminal narrowing.    L4-L5: Disc bulge. Minimal canal stenosis. Minimal foraminal narrowing.    L5-S1: Facet disease and disc bulge. No canal stenosis. Mild foraminal narrowing.    Impression  Multilevel mild degenerative changes of the lumbar spine.      Electronically Signed: Geovanni Munoz MD  12/12/2024 10:16 PM EST  Workstation ID: [id number]        Independent review of radiographic imaging: Available for my interpretation is a lumbar MRI dated December 12, 2024 demonstrating L5/S1 degenerative disc disease with Modic 2 endplate changes; there is an S1 hemangioma; there is annular tear at the posterior L5/S1 disc; there is no high-grade canal stenosis; bilateral L5/S1 neuroforaminal stenosis.    Impression & Plan:       01/13/2025: Willow Coburn is a 59 y.o. female with past medical history significant for recent significant unexplained weight loss, who presents to the pain clinic for evaluation and treatment of chronic left-sided low back pain with radiation into the left buttock.  MRI interpretation as above.  Evaluation consistent with left-sided sacroiliac joint pain versus piriformis syndrome versus facet arthropathy.  We discussed diagnostic and therapeutic sacroiliac joint steroid injection. If injection provides sustained relief can continue with joint injections no more than three times a year. If at least two diagnostic and therapeutic injetions are performed and provide relief then can consider minimally invasive sacroiliac joint fusion.  I had a discussion with the patient regarding the risks of the procedure including bleeding, infection, damage to surrounding  structures.  We discussed the potential adverse effects of corticosteroid injection including flushing of the face, lipodystrophy, skin discoloration, elevated blood glucose, increased blood pressure.  Risks of frequent steroid administration include weight gain, hormonal changes, mood changes, osteoporosis.    1. Sacroiliac joint pain        PLAN:  1. Medication Recommendations: Recommend Voltaren topical, NSAIDs, Tylenol.  Can trial turmeric 500 mg twice daily if NSAID contraindicated.  -Start baclofen for periscapular myofascial pain    2. Physical Therapy: Referral given today    3. Psychological: defer    4. Complementary and alternative (CAM) Therapies:     5. Labs/Diagnostic studies: None indicated     6. Imaging: MRI independently interpreted and reviewed with patient    7. Interventions: Schedule diagnostic and therapeutic left sacroiliac joint steroid injection (CPT 87359).    8. Referrals: PT    9. Records: PCP note reviewed    10. Lifestyle goals:    Follow-up 1 month      [facility]  Gurinder Karimi MD          Quality Metrics:

## 2025-01-15 ENCOUNTER — TELEPHONE (OUTPATIENT)
Dept: PAIN MEDICINE | Facility: CLINIC | Age: 60
End: 2025-01-15
Payer: COMMERCIAL

## 2025-01-15 NOTE — TELEPHONE ENCOUNTER
Hub staff attempted to follow warm transfer process and was unsuccessful     Caller: Willow Coburn    Relationship to patient: Self    Best call back number: [phone]    Patient is needing: CALLBACK; PATIENT STATES NEW PRESCRIPTION HAS NOT BEEN CALLED INTO PHARMACY YET AND CANNOT TELL HUB THE NAME OF THE MEDICATION

## 2025-01-16 DIAGNOSIS — M89.8X1 PERISCAPULAR PAIN: Primary | ICD-10-CM

## 2025-01-16 RX ORDER — BACLOFEN 10 MG/1
TABLET ORAL
Qty: 45 TABLET | Refills: 0 | Status: SHIPPED | OUTPATIENT
Start: 2025-01-16

## 2025-01-16 NOTE — TELEPHONE ENCOUNTER
"Surgery/Procedure:  diagnostic and therapeutic left sacroiliac joint steroid injection   Surgery/Procedure Date:  01/30/2025  Last visit:   Office Visit with Gurinder Karimi MD (01/13/2025)   Next visit: 03/03/2025     Reason for call:    Copied from HUB:    \"Patient is needing: Callback; Patient states new prescription has not been called into pharmacy yet and cannot tell hub the name of the medication.\"    Per last office note:    \"-Start baclofen for periscapular myofascial pain.\"  I do not see medication in patient's chart.        "

## 2025-01-17 ENCOUNTER — PATIENT ROUNDING (BHMG ONLY) (OUTPATIENT)
Dept: PAIN MEDICINE | Facility: CLINIC | Age: 60
End: 2025-01-17
Payer: COMMERCIAL

## 2025-01-20 ENCOUNTER — HOSPITAL ENCOUNTER (OUTPATIENT)
Dept: DIABETES SERVICES | Facility: HOSPITAL | Age: 60
Discharge: HOME | End: 2025-01-20
Payer: COMMERCIAL

## 2025-01-28 ENCOUNTER — LAB (OUTPATIENT)
Dept: LAB | Facility: HOSPITAL | Age: 60
End: 2025-01-28
Payer: COMMERCIAL

## 2025-01-28 ENCOUNTER — OFFICE VISIT (OUTPATIENT)
Dept: FAMILY MEDICINE CLINIC | Facility: CLINIC | Age: 60
End: 2025-01-28
Payer: COMMERCIAL

## 2025-01-28 VITALS
BODY MASS INDEX: 23.65 KG/M2 | TEMPERATURE: 97.7 F | HEART RATE: 92 BPM | OXYGEN SATURATION: 100 % | WEIGHT: 137.8 LBS | SYSTOLIC BLOOD PRESSURE: 152 MMHG | DIASTOLIC BLOOD PRESSURE: 82 MMHG

## 2025-01-28 DIAGNOSIS — I10 PRIMARY HYPERTENSION: ICD-10-CM

## 2025-01-28 DIAGNOSIS — D50.8 IRON DEFICIENCY ANEMIA SECONDARY TO INADEQUATE DIETARY IRON INTAKE: ICD-10-CM

## 2025-01-28 DIAGNOSIS — E55.9 VITAMIN D DEFICIENCY: ICD-10-CM

## 2025-01-28 DIAGNOSIS — E11.9 TYPE 2 DIABETES MELLITUS WITHOUT COMPLICATION, WITHOUT LONG-TERM CURRENT USE OF INSULIN: ICD-10-CM

## 2025-01-28 DIAGNOSIS — R79.89 ELEVATED SERUM CREATININE: ICD-10-CM

## 2025-01-28 DIAGNOSIS — E11.9 TYPE 2 DIABETES MELLITUS WITHOUT COMPLICATION, WITHOUT LONG-TERM CURRENT USE OF INSULIN: Primary | ICD-10-CM

## 2025-01-28 LAB
25(OH)D3 SERPL-MCNC: 51.2 NG/ML (ref 30–100)
ALBUMIN SERPL-MCNC: 3.9 G/DL (ref 3.5–5.2)
ALBUMIN/CREATININE RATIO, URINE: NORMAL
ALBUMIN/GLOB SERPL: 1.5 G/DL
ALP SERPL-CCNC: 52 U/L (ref 39–117)
ALT SERPL W P-5'-P-CCNC: 7 U/L (ref 1–33)
ANION GAP SERPL CALCULATED.3IONS-SCNC: 9 MMOL/L (ref 5–15)
AST SERPL-CCNC: 15 U/L (ref 1–32)
BASOPHILS # BLD AUTO: 0.04 10*3/MM3 (ref 0–0.2)
BASOPHILS NFR BLD AUTO: 0.5 % (ref 0–1.5)
BILIRUB SERPL-MCNC: 0.3 MG/DL (ref 0–1.2)
BUN SERPL-MCNC: 25 MG/DL (ref 6–20)
BUN/CREAT SERPL: 15.6 (ref 7–25)
CALCIUM SPEC-SCNC: 9.5 MG/DL (ref 8.6–10.5)
CHLORIDE SERPL-SCNC: 108 MMOL/L (ref 98–107)
CO2 SERPL-SCNC: 27 MMOL/L (ref 22–29)
CREAT SERPL-MCNC: 1.6 MG/DL (ref 0.57–1)
DEPRECATED RDW RBC AUTO: 40.5 FL (ref 37–54)
EGFRCR SERPLBLD CKD-EPI 2021: 37.2 ML/MIN/1.73
EOSINOPHIL # BLD AUTO: 0.18 10*3/MM3 (ref 0–0.4)
EOSINOPHIL NFR BLD AUTO: 2.2 % (ref 0.3–6.2)
ERYTHROCYTE [DISTWIDTH] IN BLOOD BY AUTOMATED COUNT: 12.2 % (ref 12.3–15.4)
EXPIRATION DATE: NORMAL
FERRITIN SERPL-MCNC: 459 NG/ML (ref 13–150)
FOLATE SERPL-MCNC: 10.9 NG/ML (ref 4.78–24.2)
GLOBULIN UR ELPH-MCNC: 2.6 GM/DL
GLUCOSE SERPL-MCNC: 95 MG/DL (ref 65–99)
HCT VFR BLD AUTO: 29.8 % (ref 34–46.6)
HGB BLD-MCNC: 10 G/DL (ref 12–15.9)
IMM GRANULOCYTES # BLD AUTO: 0.02 10*3/MM3 (ref 0–0.05)
IMM GRANULOCYTES NFR BLD AUTO: 0.2 % (ref 0–0.5)
IRON 24H UR-MRATE: 75 MCG/DL (ref 37–145)
IRON SATN MFR SERPL: 23 % (ref 20–50)
LYMPHOCYTES # BLD AUTO: 2.65 10*3/MM3 (ref 0.7–3.1)
LYMPHOCYTES NFR BLD AUTO: 32.6 % (ref 19.6–45.3)
Lab: NORMAL
MCH RBC QN AUTO: 30.4 PG (ref 26.6–33)
MCHC RBC AUTO-ENTMCNC: 33.6 G/DL (ref 31.5–35.7)
MCV RBC AUTO: 90.6 FL (ref 79–97)
MONOCYTES # BLD AUTO: 0.46 10*3/MM3 (ref 0.1–0.9)
MONOCYTES NFR BLD AUTO: 5.7 % (ref 5–12)
NEUTROPHILS NFR BLD AUTO: 4.78 10*3/MM3 (ref 1.7–7)
NEUTROPHILS NFR BLD AUTO: 58.8 % (ref 42.7–76)
NRBC BLD AUTO-RTO: 0 /100 WBC (ref 0–0.2)
PLATELET # BLD AUTO: 225 10*3/MM3 (ref 140–450)
PMV BLD AUTO: 11.6 FL (ref 6–12)
POC CREATININE URINE: 100
POC MICROALBUMIN URINE: 80
POTASSIUM SERPL-SCNC: 4 MMOL/L (ref 3.5–5.2)
PROT SERPL-MCNC: 6.5 G/DL (ref 6–8.5)
RBC # BLD AUTO: 3.29 10*6/MM3 (ref 3.77–5.28)
SODIUM SERPL-SCNC: 144 MMOL/L (ref 136–145)
TIBC SERPL-MCNC: 326 MCG/DL (ref 298–536)
TRANSFERRIN SERPL-MCNC: 219 MG/DL (ref 200–360)
VIT B12 BLD-MCNC: 299 PG/ML (ref 211–946)
WBC NRBC COR # BLD AUTO: 8.13 10*3/MM3 (ref 3.4–10.8)

## 2025-01-28 PROCEDURE — 84466 ASSAY OF TRANSFERRIN: CPT

## 2025-01-28 PROCEDURE — 82746 ASSAY OF FOLIC ACID SERUM: CPT

## 2025-01-28 PROCEDURE — 82044 UR ALBUMIN SEMIQUANTITATIVE: CPT | Performed by: FAMILY MEDICINE

## 2025-01-28 PROCEDURE — 82607 VITAMIN B-12: CPT

## 2025-01-28 PROCEDURE — 83540 ASSAY OF IRON: CPT

## 2025-01-28 PROCEDURE — 99214 OFFICE O/P EST MOD 30 MIN: CPT | Performed by: FAMILY MEDICINE

## 2025-01-28 PROCEDURE — 80053 COMPREHEN METABOLIC PANEL: CPT

## 2025-01-28 PROCEDURE — 85025 COMPLETE CBC W/AUTO DIFF WBC: CPT

## 2025-01-28 PROCEDURE — 82728 ASSAY OF FERRITIN: CPT

## 2025-01-28 PROCEDURE — 82306 VITAMIN D 25 HYDROXY: CPT

## 2025-01-28 RX ORDER — LISINOPRIL 5 MG/1
5 TABLET ORAL DAILY
Qty: 90 TABLET | Refills: 1 | Status: SHIPPED | OUTPATIENT
Start: 2025-01-28

## 2025-01-28 NOTE — PROGRESS NOTES
Subjective     Chief Complaint  blood work follow up (No concerns.)    Subjective          Willow Coburn is a 60 y.o. female who presents today to [facility] for initial evaluation .    HPI:   Fall  Associated symptoms include abdominal pain.       Ms. Coburn is a very pleasant 59 year old female who presents today to establish care with a primary care provider and have a annual physical exam.    She has a past medical history is GERD, carpal tunnel syndrome.    Information from last visit:     She had a fall about 3 years ago where she fell down some stairs.  She has been having significant lower back pain and left hip pain.  The pain radiates mostly down the left side of her leg to the knee sometimes she has pain on the right leg.  She has numbness and tingling in her lower extremities as well.  No indication of saddle anesthesia or bowel or bladder incontinence.    Additionally, she has had almost 100 lbs weight loss over the last three years. She has intermittent abdominal pain and diarrhea. She had a colonoscopy in her 30s. She denies a known history of polyps then. Her father had a history of leukemia. Sometimes she gets dizzy.     Due to her history of fall and sinus symptoms x-rays obtained degenerative changes in the lumbar spine and a moderate to severe bilateral hip arthritis.  MRI was obtained and positive for displaced narrowing at L5-S1.  I have subsequently referred her to pain management who she is not following with.    Her baseline labs were obtained due to her physical being due and her symptoms of weight loss.  She was found to be type II diabetic with a hemoglobin A1c of 7.0%.  I had started her on Metformin 500 mg twice daily.  She was also found to be iron and vitamin D deficient.  Her blood pressure is little elevated today at 152/82.  She is not on any hypertensives.  She is due for a repeat set of labs.   Her blood sugars have been stable and  around 111-124     Pain management had prescribed baclofen. When she would take it at night, she would hear some weird sounds in the mornings. This resolved after she stopped taking it.     Also, of note she had an abnormal mammogram that is suspicious, planned for stereotactic biopsy.  However, due to the location of the lesion radiology opted to cancel the biopsy and repeat diagnostic mammogram in 6 months.    The following portions of the patient's history were reviewed and updated as appropriate: allergies, current medications, past family history, past medical history, past social history, past surgical history and problem list.    Objective     Objective     Allergy:   No Known Allergies     Current Medications:   Current Outpatient Medications   Medication Sig Dispense Refill    Glucose Blood (Blood Glucose Test) strip Inject 1 each under the skin into the appropriate area as directed 2 (Two) Times a Day. 100 each 11    Lancets Micro Thin 33G misc Inject 1 each under the skin into the appropriate area as directed Daily. 100 each 11    metFORMIN (GLUCOPHAGE) 500 MG tablet Take 1 tablet by mouth 2 (Two) Times a Day With Meals. 180 tablet 1    vitamin D (ERGOCALCIFEROL) 1.25 MG (13398 UT) capsule capsule TAKE 1 CAPSULE BY MOUTH ONCE WEEKLY 12 capsule 0    lisinopril (PRINIVIL,ZESTRIL) 5 MG tablet Take 1 tablet by mouth Daily. 90 tablet 1     No current facility-administered medications for this visit.       Past Medical History:  Past Medical History:   Diagnosis Date    Carpal tunnel syndrome     Uterine fibroid        Past Surgical History:  Past Surgical History:   Procedure Laterality Date    BREAST BIOPSY Left     needle biopsy    CHOLECYSTECTOMY      HYSTERECTOMY         Social History:  Social History     Socioeconomic History    Marital status: Single   Tobacco Use    Smoking status: Never    Smokeless tobacco: Never   Vaping Use    Vaping status: Never Used   Substance and Sexual Activity    Alcohol  use: Not Currently    Drug use: Never    Sexual activity: Not Currently     Partners: Male     Birth control/protection: Hysterectomy       Family History:  Family History   Problem Relation Age of Onset    Leukemia Father     Breast cancer Niece     Cancer Niece     Ovarian cancer Neg Hx          Vital Signs:   /82   Pulse 92   Temp 97.7 °F (36.5 °C) (Temporal)   Wt 62.5 kg (137 lb 12.8 oz)   SpO2 100%   BMI 23.65 kg/m²      Physical Exam:  Physical Exam  Vitals reviewed.   Constitutional:       Appearance: She is not ill-appearing.   Eyes:      Pupils: Pupils are equal, round, and reactive to light.   Cardiovascular:      Rate and Rhythm: Normal rate.      Pulses: Normal pulses.   Pulmonary:      Effort: Pulmonary effort is normal.      Breath sounds: Normal breath sounds.   Neurological:      General: No focal deficit present.      Mental Status: She is alert. Mental status is at baseline.   Psychiatric:         Mood and Affect: Mood normal.         Behavior: Behavior normal.         Thought Content: Thought content normal.         Judgment: Judgment normal.               PHQ-9 Score  PHQ-9 Total Score:      Lab Review  Lab Requisition on 2024   Component Date Value Ref Range Status    Reference Lab Report 2024    Final                    Value:[facility]  [address] [place] [address]  Phone: [phone] or [phone]  Fax: [phone]  Kevin Elizondo M.D., Medical Director    PATIENT NAME                           LABORATORY NO.  MARY ALICE ALBRECHT                        [id number]  [id number]                         AGE              SEX  SSN           CLIENT REF #  [facility]           59      1965  F    [id number]   [id number]    [address]              REQUESTING M.D.     ATTENDING [place] [address]                DUKE BARBA KAYLA  DATE COLLECTED      DATE RECEIVED       "DATE REPORTED  12/05/2024 12/06/2024 12/09/2024    DIAGNOSIS:  A.   DUODENUM, BIOPSY:  Small bowel mucosa with no significant pathologic abnormality  B.   GASTRIC ANTRUM, BIOPSY:  Marked reactive gastropathy  No helicobacter pylori like organisms identified on H&E stained slide  No intestinal metaplasia or dysplasia                           identified  C.   ASCENDING COLON POLYP:  Tubular adenoma; no high grade dysplasia identified  D.   TRANSVERSE COLON POLYP:  Tubular adenoma; no high grade dysplasia identified    CLINICAL HISTORY:  Gastro-esophageal reflux disease without esophagitis, diverticulosis of large  intestine without perforation or abscess without bleeding, benign neoplasm of  transverse colon, benign neoplasm of ascending colon, encounter for screening  for malignant neoplasm of colon    SPECIMENS RECEIVED:  A.  DUODENUM, BIOPSY  B.  GASTRIC ANTRUM, BIOPSY  C.  ASCENDING COLON POLYP  D.  TRANSVERSE COLON POLYP    MICROSCOPIC DESCRIPTION:  A.  Tissue blocks are prepared and slides are examined microscopically on all  specimens. See diagnosis for details.  B.  Tissue blocks are prepared and slides are examined microscopically on all  specimens. See diagnosis for details.  C.  Tissue blocks are prepared and slides are examined microscopically on all  specimens. See diagnosis for details.  D.  Tissue blocks are                           prepared and slides are examined microscopically on all  specimens. See diagnosis for details.    Professional interpretation rendered by Ju Butler D.O., F.C.A.P. at  [employer], [facility], [address] [place] [address].    GROSS DESCRIPTION:  A.  Labeled \"duodenum second portion\" consisting of multiple pieces of tan  soft tissue measuring 1.0 x 0.8 x 0.2 cm in aggregate.  Submitted entirely  in 1 cassette.  [facility]  B.  Labeled \"gastric antrum\" consisting of 3 pieces of tan soft tissue measuring  0.6 x 0.3 x 0.2 cm in aggregate.  Submitted entirely " "in 1 cassette.  C.  Labeled \"ascending colon polyp\" consisting of multiple pieces of tan soft  tissue measuring 1.4 x 0.7 x 0.2 cm in aggregate.  Submitted entirely in 1  cassette.  D.  Labeled \"transverse colon polyp\" consisting of multiple pieces of tan soft  tissue measuring 1.3 x 0.3 x 0.2 cm in aggregate.  Submitted entirely in 1  cassette.    REVIEWED, DIAGNOSED AND ELECTRONICALLY  SIGNED BY:    Ju Butler D.O.,                           F.C.A.P.  CPT CODES:  88305x4     Orders Only on 11/27/2024   Component Date Value Ref Range Status    TyrerCuzick 11/27/2024 8.4   Final    NCCN 11/27/2024 NCCN not met   Final    High Risk Cancer Risk Assessment   Lab on 11/19/2024   Component Date Value Ref Range Status    Glucose 11/19/2024 135 (H)  65 - 99 mg/dL Final    BUN 11/19/2024 17  6 - 20 mg/dL Final    Creatinine 11/19/2024 1.24 (H)  0.57 - 1.00 mg/dL Final    Sodium 11/19/2024 145  136 - 145 mmol/L Final    Potassium 11/19/2024 4.2  3.5 - 5.2 mmol/L Final    Chloride 11/19/2024 106  98 - 107 mmol/L Final    CO2 11/19/2024 29.0  22.0 - 29.0 mmol/L Final    Calcium 11/19/2024 9.8  8.6 - 10.5 mg/dL Final    Total Protein 11/19/2024 6.7  6.0 - 8.5 g/dL Final    Albumin 11/19/2024 4.1  3.5 - 5.2 g/dL Final    ALT (SGPT) 11/19/2024 14  1 - 33 U/L Final    AST (SGOT) 11/19/2024 17  1 - 32 U/L Final    Alkaline Phosphatase 11/19/2024 65  39 - 117 U/L Final    Total Bilirubin 11/19/2024 0.5  0.0 - 1.2 mg/dL Final    Globulin 11/19/2024 2.6  gm/dL Final    A/G Ratio 11/19/2024 1.6  g/dL Final    BUN/Creatinine Ratio 11/19/2024 13.7  7.0 - 25.0 Final    Anion Gap 11/19/2024 10.0  5.0 - 15.0 mmol/L Final    eGFR 11/19/2024 50.9 (L)  >60.0 mL/min/1.73 Final    Hemoglobin A1C 11/19/2024 7.00 (H)  4.80 - 5.60 % Final    Total Cholesterol 11/19/2024 163  0 - 200 mg/dL Final    Triglycerides 11/19/2024 139  0 - 150 mg/dL Final    HDL Cholesterol 11/19/2024 38 (L)  40 - 60 mg/dL Final    LDL Cholesterol  11/19/2024 100  0 " - 100 mg/dL Final    VLDL Cholesterol 11/19/2024 25  5 - 40 mg/dL Final    LDL/HDL Ratio 11/19/2024 2.56   Final    TSH 11/19/2024 1.380  0.270 - 4.200 uIU/mL Final    25 Hydroxy, Vitamin D 11/19/2024 15.6 (L)  30.0 - 100.0 ng/ml Final    Vitamin B-12 11/19/2024 366  211 - 946 pg/mL Final    HIV DUO 11/19/2024 Non-Reactive  Non-Reactive Final    Sed Rate 11/19/2024 1  0 - 30 mm/hr Final    C-Reactive Protein 11/19/2024 <0.30  0.00 - 0.50 mg/dL Final    Hepatitis C Ab 11/19/2024 Non-Reactive  Non-Reactive Final    LDH 11/19/2024 213  135 - 214 U/L Final    H. pylori Breath Test 11/19/2024 Negative  Negative Final    WBC 11/19/2024 8.78  3.40 - 10.80 10*3/mm3 Final    RBC 11/19/2024 3.73 (L)  3.77 - 5.28 10*6/mm3 Final    Hemoglobin 11/19/2024 11.0 (L)  12.0 - 15.9 g/dL Final    Hematocrit 11/19/2024 33.3 (L)  34.0 - 46.6 % Final    MCV 11/19/2024 89.3  79.0 - 97.0 fL Final    MCH 11/19/2024 29.5  26.6 - 33.0 pg Final    MCHC 11/19/2024 33.0  31.5 - 35.7 g/dL Final    RDW 11/19/2024 12.0 (L)  12.3 - 15.4 % Final    RDW-SD 11/19/2024 39.0  37.0 - 54.0 fl Final    MPV 11/19/2024 11.5  6.0 - 12.0 fL Final    Platelets 11/19/2024 220  140 - 450 10*3/mm3 Final    Neutrophil % 11/19/2024 61.9  42.7 - 76.0 % Final    Lymphocyte % 11/19/2024 30.3  19.6 - 45.3 % Final    Monocyte % 11/19/2024 4.4 (L)  5.0 - 12.0 % Final    Eosinophil % 11/19/2024 2.3  0.3 - 6.2 % Final    Basophil % 11/19/2024 0.6  0.0 - 1.5 % Final    Immature Grans % 11/19/2024 0.5  0.0 - 0.5 % Final    Neutrophils, Absolute 11/19/2024 5.44  1.70 - 7.00 10*3/mm3 Final    Lymphocytes, Absolute 11/19/2024 2.66  0.70 - 3.10 10*3/mm3 Final    Monocytes, Absolute 11/19/2024 0.39  0.10 - 0.90 10*3/mm3 Final    Eosinophils, Absolute 11/19/2024 0.20  0.00 - 0.40 10*3/mm3 Final    Basophils, Absolute 11/19/2024 0.05  0.00 - 0.20 10*3/mm3 Final    Immature Grans, Absolute 11/19/2024 0.04  0.00 - 0.05 10*3/mm3 Final    nRBC 11/19/2024 0.0  0.0 - 0.2 /100 WBC Final     Iron 11/19/2024 108  37 - 145 mcg/dL Final    Iron Saturation (TSAT) 11/19/2024 31  20 - 50 % Final    Transferrin 11/19/2024 232  200 - 360 mg/dL Final    TIBC 11/19/2024 346  298 - 536 mcg/dL Final    Folate 11/19/2024 14.10  4.78 - 24.20 ng/mL Final    Ferritin 11/19/2024 605.00 (H)  13.00 - 150.00 ng/mL Final        Radiology Results  Mammo Diagnostic Digital Tomosynthesis Bilateral With CAD    Result Date: 12/31/2024  Impression: BI-RADS 4 suspicious abnormality left breast  RECOMMENDATION: Affirm guided tomographic biopsy of microcalcifications in the left breast using a CC above approach. If results are benign the patient will need 6-month follow-up diagnostic bilateral mammogram with repeat magnification views of other coarse similar-appearing calcifications in the right breast.    The standard false-negative rate of mammography is between 10% and 25%. Complex patterns or increased breast density will markedly elevate the false-negative rate of mammography.   A results letter, in lay terminology, will be given to the patient at the conclusion of the exam.  ________________________________________________________________________ _______ Physicians Order  Stereotactic Breast Biopsy  Diagnosis: Abnormal Mammogram  This report was finalized on 12/31/2024 1:22 PM by Dr. Renee Castillo MD.        Assessment / Plan         Assessment and Plan   Diagnoses and all orders for this visit:    1. Type 2 diabetes mellitus without complication, without long-term current use of insulin (Primary)  Assessment & Plan:  Diabetes is improving with treatment.   Continue current treatment regimen.  Recommended an ADA diet.  Regular aerobic exercise.  Reminded to get yearly retinal exam.  Diabetes will be reassessed in 3 months    Orders:  -     Microalbumin / Creatinine Urine Ratio - Urine, Clean Catch  -     CBC & Differential; Future  -     Comprehensive Metabolic Panel; Future    2. Iron deficiency anemia secondary to  inadequate dietary iron intake  -     CBC & Differential; Future  -     Vitamin B12; Future  -     Iron Profile; Future  -     Folate; Future  -     Ferritin; Future    3. Vitamin D deficiency  -     Vitamin D,25-Hydroxy; Future  -     Vitamin B12; Future    4. Elevated serum creatinine  -     Comprehensive Metabolic Panel; Future    5. Primary hypertension  -     lisinopril (PRINIVIL,ZESTRIL) 5 MG tablet; Take 1 tablet by mouth Daily.  Dispense: 90 tablet; Refill: 1          Discussed possible differential diagnoses, testing, treatment, recommended non-pharmacological interventions, risks, warning signs to monitor for that would indicate need for follow-up in clinic or ER. If no improvement with these regimens or you have new or worsening symptoms follow-up. Patient verbalizes understanding and agreement with plan of care. Denies further needs or concerns.     Patient was given instructions and counseling regarding her condition and for health maintenance advised.    BMI is within normal parameters. No other follow-up for BMI required.      Health Maintenance  Health Maintenance:   Health Maintenance Due   Topic Date Due    Pneumococcal Vaccine 0-64 (1 of 2 - PCV) Never done    DIABETIC FOOT EXAM  Never done    DIABETIC EYE EXAM  Never done    Hepatitis B (1 of 3 - 19+ 3-dose series) Never done    TDAP/TD VACCINES (1 - Tdap) Never done    ZOSTER VACCINE (1 of 2) Never done    COVID-19 Vaccine (3 - 2024-25 season) 09/01/2024        Meds ordered during this visit  New Medications Ordered This Visit   Medications    lisinopril (PRINIVIL,ZESTRIL) 5 MG tablet     Sig: Take 1 tablet by mouth Daily.     Dispense:  90 tablet     Refill:  1       Meds stopped during this visit:  Medications Discontinued During This Encounter   Medication Reason    methylPREDNISolone (MEDROL) 4 MG dose pack *Therapy completed    docusate sodium (COLACE) 100 MG capsule *Therapy completed    sodium-potassium-magnesium sulfates (Suprep Bowel  Prep Kit) 17.5-3.13-1.6 GM/177ML solution oral solution *Therapy completed    Multiple Vitamins-Minerals (One-A-Day Womens VitaCraves) chewable tablet *Therapy completed    baclofen (LIORESAL) 10 MG tablet         Visit Diagnoses    ICD-10-CM ICD-9-CM   1. Type 2 diabetes mellitus without complication, without long-term current use of insulin  E11.9 250.00   2. Iron deficiency anemia secondary to inadequate dietary iron intake  D50.8 280.1   3. Vitamin D deficiency  E55.9 268.9   4. Elevated serum creatinine  R79.89 790.99   5. Primary hypertension  I10 401.9         Patient was given instructions and counseling regarding her condition or for health maintenance advice. Please see specific information pulled into the AVS if appropriate.     Follow Up   Return in about 3 months (around 4/28/2025) for Next scheduled follow up.      This document has been electronically signed by Sondra Valencia DO   January 28, 2025 09:09 EST    Dictated Utilizing Dragon Dictation: Part of this note may be an electronic transcription/translation of spoken language to printed text using the Dragon Dictation System.    Sondra Valencia D.O.  [facility]

## 2025-01-30 DIAGNOSIS — R79.89 ELEVATED SERUM CREATININE: Primary | ICD-10-CM

## 2025-03-03 ENCOUNTER — OFFICE VISIT (OUTPATIENT)
Dept: PAIN MEDICINE | Facility: CLINIC | Age: 60
End: 2025-03-03
Payer: COMMERCIAL

## 2025-03-03 VITALS — WEIGHT: 127 LBS | BODY MASS INDEX: 21.68 KG/M2 | HEIGHT: 64 IN

## 2025-03-03 DIAGNOSIS — M53.3 PAIN OF LEFT SACROILIAC JOINT: ICD-10-CM

## 2025-03-03 DIAGNOSIS — M53.3 SACROILIAC JOINT PAIN: Primary | ICD-10-CM

## 2025-03-03 DIAGNOSIS — M89.8X1 PERISCAPULAR PAIN: ICD-10-CM

## 2025-03-03 PROCEDURE — 99213 OFFICE O/P EST LOW 20 MIN: CPT

## 2025-03-03 RX ORDER — METHOCARBAMOL 500 MG/1
500 TABLET, FILM COATED ORAL 2 TIMES DAILY PRN
Qty: 60 TABLET | Refills: 1 | Status: SHIPPED | OUTPATIENT
Start: 2025-03-03

## 2025-03-03 NOTE — PROGRESS NOTES
Referring Physician: No referring provider defined for this encounter.    Primary Physician: Sondra Valencia DO    CHIEF COMPLAINT or REASON FOR VISIT: Follow-up (Follow Up After - SI Joint )      Initial history of present illness on 01/13/2025:  Ms. Willow Coburn is 60 y.o. female who presents as a new patient referral for evaluation treatment of chronic axial low back pain with radiation to the left buttock.  Patient identifies a focal source of pain at the left lumbosacral junction.  This started approximately 8 months ago.  Over that timeframe she has additionally had some significant weight loss of approximately 100 pounds without any known cause.  She believes this may be secondary to a recent diagnosis of diverticulitis.  This is being monitored and managed by her primary care physician Dr. Valencia.  She has undergone GI workup, scopes etc.  She denies any history of spinal surgery or intervention.  She denies any history of physical therapy.  She has trialed acetaminophen with mild benefit, NSAIDs with modest benefit.  Patient denies any lower extremity motor dysfunction or radiation beyond the buttock.  She denies any saddle anesthesia.  She does endorse unexplained weight loss.  She additionally complains of bilateral periscapular thoracic back pain.    Interval history:  Patient returns to clinic today.  She was set up for a left sacroiliac joint injection however this did not take place.  Additionally, she was started on baclofen at her last office visit for bilateral periscapular thoracic back pain.  Today, she continues to complain of chronic left-sided low back/buttock pain as well as chronic periscapular thoracic back pain.  She is not particularly interested in injections as she is not fond of needles, and it would be quite expensive for her.  She is interested in conservative measures to help alleviate her pain.  She is no longer taking baclofen due to side effects.  She reports she heard  "\"crunching in her ears.\"  She is set to start physical therapy tomorrow.    Interventions:      Objective Pain Scoring:   BRIEF PAIN INVENTORY:  Total score:   Pain Score    03/03/25 0854   PainSc: 4    PainLoc: Shoulder      PHQ-2: 3  PHQ-9: 4  Opioid Risk Tool:         Review of Systems:   ROS negative except as otherwise noted     Past Medical History:   Past Medical History:   Diagnosis Date    Carpal tunnel syndrome     Uterine fibroid          Past Surgical History:   Past Surgical History:   Procedure Laterality Date    BREAST BIOPSY Left     needle biopsy    CHOLECYSTECTOMY      HYSTERECTOMY           Family History   Family History   Problem Relation Age of Onset    Leukemia Father     Breast cancer Niece     Cancer Niece     Ovarian cancer Neg Hx          Social History   Social History     Socioeconomic History    Marital status: Single   Tobacco Use    Smoking status: Never    Smokeless tobacco: Never   Vaping Use    Vaping status: Never Used   Substance and Sexual Activity    Alcohol use: Not Currently    Drug use: Never    Sexual activity: Not Currently     Partners: Male     Birth control/protection: Hysterectomy        Medications:     Current Outpatient Medications:     Glucose Blood (Blood Glucose Test) strip, Inject 1 each under the skin into the appropriate area as directed 2 (Two) Times a Day., Disp: 100 each, Rfl: 11    Lancets Micro Thin 33G misc, Inject 1 each under the skin into the appropriate area as directed Daily., Disp: 100 each, Rfl: 11    lisinopril (PRINIVIL,ZESTRIL) 5 MG tablet, Take 1 tablet by mouth Daily., Disp: 90 tablet, Rfl: 1    metFORMIN (GLUCOPHAGE) 500 MG tablet, Take 1 tablet by mouth 2 (Two) Times a Day With Meals., Disp: 180 tablet, Rfl: 1    vitamin D (ERGOCALCIFEROL) 1.25 MG (73458 UT) capsule capsule, TAKE 1 CAPSULE BY MOUTH ONCE WEEKLY, Disp: 12 capsule, Rfl: 0    methocarbamol (ROBAXIN) 500 MG tablet, Take 1 tablet by mouth 2 (Two) Times a Day As Needed for Muscle " "Spasms., Disp: 60 tablet, Rfl: 1        Physical Exam:     Vitals:    03/03/25 0854   Weight: 57.6 kg (127 lb)   Height: 162.6 cm (64\")   PainSc: 4    PainLoc: Shoulder        General: Alert and oriented, No acute distress.   HEENT: Normocephalic, atraumatic.   Cardiovascular: No gross edema  Respiratory: Respirations are non-labored    Cervical Spine:   No masses or atrophy  Range of motion - Flexion normal. Extension normal. Lateral rotation normal.   Palpation - nontender   Spurling's - negative     Thoracic Spine:   Inspection: no gross abnormality  Paraspinal muscle palpation: Tender bilaterally  Spinous process palpation: nontender    Lumbar Spine:   No masses or atrophy  Range of motion - Flexion normal. Extension normal.    Facet Loading: Negative bilaterally  Facet Palpation - Nontender   Peña finger/Gaenslen's/Cooper's/HARRY/Thigh thrust -positive left  Straight leg raise/slump test: Negative bilaterally  Multifidus toe-touch test:  TTP left piriformis  Negative FADIR test    Motor Exam:    Strength: Rate on 1-5 scale Right Left    C5-Elbow flexion, Deltoid 5/5  5/5    C6-Wrist extension 5/5  5/5    C7- Elbow / finger extension 5/5  5/5    C8- Finger flexion 5/5  5/5    T1- Intrinsics hand 5/5  5/5    Strength: Rate on 1-5 scale Right Left    L1/2- hip flexion 5/5  5/5    L3- knee extension 5/5  5/5    L4- ankle dorsiflexion 5/5  5/5    L5- great toe extension 5/5  5/5    S1- ankle plantarflexion 5/5  5/5    Sensory Exam: Full and equal sensation to light touch throughout.       Neurologic: Cranial Nerves II-XII are grossly intact.     Psychiatric: Cooperative.   Gait: Normal   Assistive Devices: None    Imaging Studies:   Results for orders placed during the hospital encounter of 12/12/24    MRI Lumbar Spine Without Contrast    Narrative  MRI LUMBAR SPINE WO CONTRAST    Date of Exam: 12/12/2024 3:41 PM EST    Indication: anterolisthesis.    Comparison: None available.    Technique:  Routine " multiplanar/multisequence sequence images of the lumbar spine were obtained without contrast administration.    Findings: Lumbar vertebral bodies are normal normal. Disc space narrowing at L5-S1. Heterogeneous appearance of the bone marrow likely due to demineralization. No marrow edema. Benign hemangioma in the S1. Multiple T2 hyperintensities involving both  kidneys consistent with small renal cysts. Colonic diverticulosis without evidence of diverticulitis. The conus medullaris terminates at the L2 vertebral body level. No cord signal abnormalities.    T12-L1: No significant spinal canal or foraminal narrowing.    L1-L2: No significant spinal canal or foraminal narrowing.    L2-L3: Disc bulge. Minimal canal stenosis. Minimal foraminal narrowing.    L3-L4: Disc bulge. Minimal canal stenosis. Minimal foraminal narrowing.    L4-L5: Disc bulge. Minimal canal stenosis. Minimal foraminal narrowing.    L5-S1: Facet disease and disc bulge. No canal stenosis. Mild foraminal narrowing.    Impression  Multilevel mild degenerative changes of the lumbar spine.      Electronically Signed: Geovanni Munoz MD  12/12/2024 10:16 PM EST  Workstation ID: [id number]        Independent review of radiographic imaging: Available for my interpretation is a lumbar MRI dated December 12, 2024 demonstrating L5/S1 degenerative disc disease with Modic 2 endplate changes; there is an S1 hemangioma; there is annular tear at the posterior L5/S1 disc; there is no high-grade canal stenosis; bilateral L5/S1 neuroforaminal stenosis.    Impression & Plan:       01/13/2025: Willow Coburn is a 60 y.o. female with past medical history significant for recent significant unexplained weight loss, who presents to the pain clinic for evaluation and treatment of chronic left-sided low back pain with radiation into the left buttock.  MRI interpretation as above.  Evaluation consistent with left-sided sacroiliac joint pain versus piriformis syndrome versus facet  arthropathy.  We discussed diagnostic and therapeutic sacroiliac joint steroid injection. If injection provides sustained relief can continue with joint injections no more than three times a year. If at least two diagnostic and therapeutic injetions are performed and provide relief then can consider minimally invasive sacroiliac joint fusion.  I had a discussion with the patient regarding the risks of the procedure including bleeding, infection, damage to surrounding structures.  We discussed the potential adverse effects of corticosteroid injection including flushing of the face, lipodystrophy, skin discoloration, elevated blood glucose, increased blood pressure.  Risks of frequent steroid administration include weight gain, hormonal changes, mood changes, osteoporosis.  3/3/2025: Patient canceled left SIJ.  Discontinue baclofen due to side effects.  Start methocarbamol.  May consider left SIJ if patient would like; she politely declines today    1. Sacroiliac joint pain    2. Pain of left sacroiliac joint    3. Periscapular pain          PLAN:  1. Medication Recommendations: Recommend Voltaren topical, NSAIDs, Tylenol.  Can trial turmeric 500 mg twice daily if NSAID contraindicated.  -Discontinue baclofen due to side effects  -Start methocarbamol 500 mg 1 to 2 tablets daily as needed    2. Physical Therapy: She is set to start PT tomorrow    3. Psychological: defer    4. Complementary and alternative (CAM) Therapies:     5. Labs/Diagnostic studies: None indicated     6. Imagin. Interventions: May consider left sacroiliac joint steroid injection; patient politely declined this today    8. Referrals:    9. Records:  10. Lifestyle goals:    Follow-up 3-4 months      [facility]  Fortino Rodriguez PA-C          Quality Metrics:

## 2025-03-04 ENCOUNTER — TREATMENT (OUTPATIENT)
Dept: PHYSICAL THERAPY | Facility: CLINIC | Age: 60
End: 2025-03-04
Payer: COMMERCIAL

## 2025-03-04 ENCOUNTER — LAB (OUTPATIENT)
Dept: LAB | Facility: HOSPITAL | Age: 60
End: 2025-03-04
Payer: COMMERCIAL

## 2025-03-04 DIAGNOSIS — M25.552 LEFT HIP PAIN: Primary | ICD-10-CM

## 2025-03-04 DIAGNOSIS — R79.89 ELEVATED SERUM CREATININE: ICD-10-CM

## 2025-03-04 LAB
ANION GAP SERPL CALCULATED.3IONS-SCNC: 9 MMOL/L (ref 5–15)
BUN SERPL-MCNC: 14 MG/DL (ref 6–20)
BUN/CREAT SERPL: 10.1 (ref 7–25)
CALCIUM SPEC-SCNC: 9.4 MG/DL (ref 8.6–10.5)
CHLORIDE SERPL-SCNC: 112 MMOL/L (ref 98–107)
CO2 SERPL-SCNC: 27 MMOL/L (ref 22–29)
CREAT SERPL-MCNC: 1.39 MG/DL (ref 0.57–1)
EGFRCR SERPLBLD CKD-EPI 2021: 44.1 ML/MIN/1.73
GLUCOSE SERPL-MCNC: 116 MG/DL (ref 65–99)
POTASSIUM SERPL-SCNC: 4.3 MMOL/L (ref 3.5–5.2)
SODIUM SERPL-SCNC: 148 MMOL/L (ref 136–145)

## 2025-03-04 PROCEDURE — 80048 BASIC METABOLIC PNL TOTAL CA: CPT

## 2025-03-04 PROCEDURE — 36415 COLL VENOUS BLD VENIPUNCTURE: CPT

## 2025-03-04 NOTE — PROGRESS NOTES
Physical Therapy Initial Evaluation and Plan of Care    [facility] [place]  [address]  [place] [id number] [phone]    Patient: Willow Coburn   : 1965  Diagnosis/ICD-10 Code:  No primary diagnosis found.  Referring practitioner: Gurinder Karimi MD    Subjective Evaluation    History of Present Illness  Date of onset: 2024  Mechanism of injury: Chronic    Subjective comment: Fell down her stairs at home when carrying laundry.  Did not get over the last step and fell backward.   This occured about 3 years ago. Sustained no injuries and had no pain after the fall.  Developed left sided posterior hip pain in September last year.  Has infrequent numbness along the posterior lower leg.  Lost about 100 lbs over the coure of 2 years.  Her PCP is aware of the weight loss.  Reports numbness in bilateral 2nd and 3rd digits of the lower extremities. (Pain is located in the left posterior hip.  Has visited with Dr. Karimi in pain management.)  Patient Occupation: [employer] full-time; [employer] part-time (Evenings) Quality of life: good    Pain  Alleviating factors: Muscle relaxer (sleep quality improvement)  Exacerbated by: Walking (left hip catching sensation); *pain is constant*  Progression: no change    Social Support  Lives with: alone    Treatments  Current treatment: medication  Patient Goals  Patient goals for therapy: decreased pain           *REHAN:  28%    Objective          Neurological Testing     Reflexes   Left   Patellar (L4): absent (0)  Achilles (S1): absent (0)    Right   Patellar (L4): absent (0)  Achilles (S1): absent (0)    Active Range of Motion     Lumbar   Flexion: WFL  Extension: WFL  Left lateral flexion: WFL  Right lateral flexion: WFL    Strength/Myotome Testing     Lumbar   Left   Normal strength    Right   Normal strength    Left Hip   Planes of Motion   Flexion: 4-  Abduction: 4    Right Hip   Planes of Motion    Flexion: 4-  Abduction: 4-    Tests       Thoracic   Negative slump.     Lumbar     Left   Negative quadrant.     Right   Negative quadrant.           Assessment & Plan       Assessment  Impairments: activity intolerance, impaired physical strength, lacks appropriate home exercise program, pain with function and weight-bearing intolerance   Functional limitations: walking, uncomfortable because of pain and unable to perform repetitive tasks   Assessment details: Ms. Coburn is a very pleasant 60 year old female that presents to physical therapy with chronic left posterior hip pain.  Symptoms started insidiously about 6 months ago.  Had fallen backward down a flight of stairs about 3 years ago.  PMH was covered and reviewed during interview.  Neurological exam is unremarkable.  Lumbar spine cleared.  No reproduction of left hip pain with neural tension testing.  Left hip mobility is WNL in all planes without pain.  Has poor hip strength in all planes bilaterally.  Localized tenderness noted along the left posterior hip region.  Will focus care on improving left hip strength and general balance.  Hopefully, with improving proximal hip strength will reduce pain levels during ADLs/iADLs.  Prognosis: fair    Goals  Plan Goals: STGs:  1.)  REHAN improved x 1 MCID in 6 weeks.  2.)  Have no catching in left hip region while ambulating in 6 weeks.  LTGs:  1.)  No longer have constant pain in 12 weeks.  2.)  Have 4+/5 left hip strength in all planes in 12 weeks.    Plan  Therapy options: will be seen for skilled therapy services  Planned modality interventions: thermotherapy (hydrocollator packs), cryotherapy and high voltage pulsed current (pain management)  Planned therapy interventions: therapeutic activities, stretching, strengthening, manual therapy, abdominal trunk stabilization, balance/weight-bearing training, functional ROM exercises and home exercise program  Frequency: 2x month  Duration in visits: 6  Duration in  weeks: 12  Treatment plan discussed with: patient        Manual Therapy:    9     mins  13880;  Therapeutic Exercise:    10     mins  20175;     Neuromuscular Jackie:        mins  26698;    Therapeutic Activity:          mins  84164;     Gait Training:           mins  10283;     Ultrasound:         mins  98880;    Electrical Stimulation:         mins  84056 ( );  Dry Needling          mins self-pay    Timed Treatment:   19   mins   Total Treatment:     55   mins    PT SIGNATURE: George Diaz, PT   DATE TREATMENT INITIATED: 3/4/2025    Initial Certification  Certification Period: 6/2/2025  I certify that the therapy services are furnished while this patient is under my care.  The services outlined above are required by this patient, and will be reviewed every 90 days.     PHYSICIAN: Gurinder Karimi MD  NPI: [id number]                                      DATE:    Please sign and return via fax to [phone].. Thank you, [facility].

## 2025-03-05 DIAGNOSIS — N18.32 STAGE 3B CHRONIC KIDNEY DISEASE: ICD-10-CM

## 2025-03-05 DIAGNOSIS — R79.89 ELEVATED SERUM CREATININE: Primary | ICD-10-CM

## 2025-03-06 ENCOUNTER — TELEPHONE (OUTPATIENT)
Dept: FAMILY MEDICINE CLINIC | Facility: CLINIC | Age: 60
End: 2025-03-06
Payer: COMMERCIAL

## 2025-03-06 NOTE — TELEPHONE ENCOUNTER
Called patient and she voiced understanding.    ----- Message from Sondra Valencia sent at 3/5/2025  5:31 PM EST -----  Please let Ms. Coburn know that I have reviewed her labs. Her creatinine is improved but still elevated. Due to this, I have placed a referral to Nephrology. Thank you, Dr. Valencia

## 2025-03-20 ENCOUNTER — TREATMENT (OUTPATIENT)
Dept: PHYSICAL THERAPY | Facility: CLINIC | Age: 60
End: 2025-03-20
Payer: COMMERCIAL

## 2025-03-20 DIAGNOSIS — M25.552 LEFT HIP PAIN: Primary | ICD-10-CM

## 2025-03-20 NOTE — PROGRESS NOTES
Physical Therapy Daily Progress Note    Patient: Willow Coburn   : 1965  Diagnosis/ICD-10 Code:  Left hip pain [M25.552]  Referring practitioner: Gurinder Karimi MD  Date of Initial Visit: Type: THERAPY  Noted: 3/4/2025  Today's Date: 3/20/2025  Patient seen for 2 sessions         Willow Coburn reports she was noticing less severity of left posterior hip pain until the weather changed.  No numbness or tingling in left leg or foot since last visit.      Subjective     Objective   See Exercise, Manual, and Modality Logs for complete treatment.       Assessment/Plan  Focused visit on improving left hip strength in all planes.   Will f/u in 2 weeks for re-assessment.           Manual Therapy:         mins  32072;  Therapeutic Exercise:    15     mins  61602;     Neuromuscular Jackie:        mins  25298;    Therapeutic Activity:     15     mins  49804;     Gait Training:           mins  64627;     Ultrasound:          mins  37867;    Electrical Stimulation:        mins  28407 ( );  Dry Needling          mins self-pay    Timed Treatment:   30   mins   Total Treatment:     30   mins    George Diaz, PT  Physical Therapist

## 2025-04-03 ENCOUNTER — TELEPHONE (OUTPATIENT)
Dept: PHYSICAL THERAPY | Facility: OTHER | Age: 60
End: 2025-04-03
Payer: COMMERCIAL

## 2025-04-03 NOTE — TELEPHONE ENCOUNTER
"  Caller: Willow Coburn \"Mi\"    Relationship: Self    What was the call regarding: PATIENT CANCELLED TODAYS APPT BECAUSE OF THE WEATHER      "

## 2025-04-08 ENCOUNTER — TREATMENT (OUTPATIENT)
Dept: PHYSICAL THERAPY | Facility: CLINIC | Age: 60
End: 2025-04-08
Payer: COMMERCIAL

## 2025-04-08 DIAGNOSIS — M25.552 LEFT HIP PAIN: Primary | ICD-10-CM

## 2025-04-08 NOTE — PROGRESS NOTES
Physical Therapy Daily Progress Note    Patient: Willow Coburn   : 1965  Diagnosis/ICD-10 Code:  Left hip pain [M25.552]  Referring practitioner: Gurinder Karimi MD  Date of Initial Visit: Type: THERAPY  Noted: 3/4/2025  Today's Date: 2025  Patient seen for 3 sessions         Willow Coburn reports that she has improvement in left hip pain since starting physical therapy.  No hip pain.  Notes no longer having posterior lower leg numbness.  Has intermittent burning of the left lateral thigh region.      Subjective     Objective   See Exercise, Manual, and Modality Logs for complete treatment.     *L hip flx/aBd/ext:   each  *(-) SLR L LE->reproduction of pain, pulling and paresthesias in the L LE    Assessment/Plan  Ms. Coburn has attended physical therapy for a total of 3 visits for chronic left posterior hip pain with somatic referral into the left lower extremity.  Has improved left hip strength in all planes.  Appears to have centralizing of paresthesias into the left thigh versus lower leg now.  Focused visit on improving left hip strength in all planes.  Combined with improving general loading tolerance of the left lower extremity.  Will f/u in 2 weeks to continue to address remaining strength deficits.  Plan discharge at that time.             Manual Therapy:    8     mins  02317;  Therapeutic Exercise:    8     mins  11892;     Neuromuscular Jackie:    8    mins  36442;    Therapeutic Activity:     8     mins  08241;     Gait Training:           mins  97893;     Ultrasound:          mins  00061;    Electrical Stimulation:         mins  79881 ( );  Dry Needling          mins self-pay    Timed Treatment:   32   mins   Total Treatment:     32   mins    George Diaz, ABBY  Physical Therapist

## 2025-04-22 ENCOUNTER — TREATMENT (OUTPATIENT)
Dept: PHYSICAL THERAPY | Facility: CLINIC | Age: 60
End: 2025-04-22
Payer: COMMERCIAL

## 2025-04-22 DIAGNOSIS — M25.552 LEFT HIP PAIN: Primary | ICD-10-CM

## 2025-04-23 ENCOUNTER — TELEPHONE (OUTPATIENT)
Dept: FAMILY MEDICINE CLINIC | Facility: CLINIC | Age: 60
End: 2025-04-23

## 2025-04-23 ENCOUNTER — TELEPHONE (OUTPATIENT)
Dept: FAMILY MEDICINE CLINIC | Facility: CLINIC | Age: 60
End: 2025-04-23
Payer: COMMERCIAL

## 2025-04-23 DIAGNOSIS — R42 DIZZINESS: Primary | ICD-10-CM

## 2025-04-23 NOTE — TELEPHONE ENCOUNTER
"Caller: Willow Coburn \"Mi\"    Relationship: Self    Best call back number:       [phone] (Mobile)     What is the best time to reach you:     ANY TIME AFTER 1:00 TODAY    Who are you requesting to speak with (clinical staff, provider,  specific staff member):     DR JOHN OR NURSE    What was the call regarding:     PATIENT STATED SHE HAS ALLERGIES AND REQUESTED A CALL BACK WITH ANY RECOMMENDATIONS OF OVER THE COUNTER MEDICATIONS THAT COULD HELP    "
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
## 2025-04-23 NOTE — TELEPHONE ENCOUNTER
Patient notified of providers response message and verbalized understanding. She is also aware PT referral was placed.

## 2025-04-23 NOTE — TELEPHONE ENCOUNTER
"Caller: Willow Coburn \"Mi\"    Relationship: Self    Best call back number:       [phone] (Mobile)     What is the medical concern/diagnosis:     DIZZINESS    What specialty or service is being requested:     PHYSICAL THERAPY    What is the provider, practice or medical service name:     [facility]    What is the office location:     [place]    What is the office phone number:     [phone]    Any additional details:     PATIENT STATED SHE ALREADY SEES PHYSICAL THERAPIST AND WAS ADVISED THAT A NEW REFERRAL WOULD BE REQUIRED TO HELP WITH HER DIZZINESS    "

## 2025-05-05 ENCOUNTER — LAB (OUTPATIENT)
Dept: LAB | Facility: HOSPITAL | Age: 60
End: 2025-05-05
Payer: COMMERCIAL

## 2025-05-05 ENCOUNTER — OFFICE VISIT (OUTPATIENT)
Dept: FAMILY MEDICINE CLINIC | Facility: CLINIC | Age: 60
End: 2025-05-05
Payer: COMMERCIAL

## 2025-05-05 VITALS
HEIGHT: 64 IN | TEMPERATURE: 98 F | DIASTOLIC BLOOD PRESSURE: 80 MMHG | BODY MASS INDEX: 22.02 KG/M2 | WEIGHT: 129 LBS | SYSTOLIC BLOOD PRESSURE: 148 MMHG | HEART RATE: 76 BPM

## 2025-05-05 DIAGNOSIS — E55.9 VITAMIN D DEFICIENCY: ICD-10-CM

## 2025-05-05 DIAGNOSIS — E78.2 MIXED HYPERLIPIDEMIA: ICD-10-CM

## 2025-05-05 DIAGNOSIS — Z01.84 IMMUNITY STATUS TESTING: ICD-10-CM

## 2025-05-05 DIAGNOSIS — N18.32 STAGE 3B CHRONIC KIDNEY DISEASE: ICD-10-CM

## 2025-05-05 DIAGNOSIS — D50.8 IRON DEFICIENCY ANEMIA SECONDARY TO INADEQUATE DIETARY IRON INTAKE: ICD-10-CM

## 2025-05-05 DIAGNOSIS — I10 PRIMARY HYPERTENSION: ICD-10-CM

## 2025-05-05 DIAGNOSIS — E11.9 TYPE 2 DIABETES MELLITUS WITHOUT COMPLICATION, WITHOUT LONG-TERM CURRENT USE OF INSULIN: Primary | ICD-10-CM

## 2025-05-05 LAB
BASOPHILS # BLD AUTO: 0.02 10*3/MM3 (ref 0–0.2)
BASOPHILS NFR BLD AUTO: 0.2 % (ref 0–1.5)
DEPRECATED RDW RBC AUTO: 38.1 FL (ref 37–54)
EOSINOPHIL # BLD AUTO: 0.13 10*3/MM3 (ref 0–0.4)
EOSINOPHIL NFR BLD AUTO: 1.3 % (ref 0.3–6.2)
ERYTHROCYTE [DISTWIDTH] IN BLOOD BY AUTOMATED COUNT: 12 % (ref 12.3–15.4)
EXPIRATION DATE: NORMAL
HBA1C MFR BLD: 5.3 % (ref 4.5–5.7)
HCT VFR BLD AUTO: 28 % (ref 34–46.6)
HGB BLD-MCNC: 9.4 G/DL (ref 12–15.9)
IMM GRANULOCYTES # BLD AUTO: 0.02 10*3/MM3 (ref 0–0.05)
IMM GRANULOCYTES NFR BLD AUTO: 0.2 % (ref 0–0.5)
LYMPHOCYTES # BLD AUTO: 1.69 10*3/MM3 (ref 0.7–3.1)
LYMPHOCYTES NFR BLD AUTO: 17.2 % (ref 19.6–45.3)
Lab: NORMAL
MCH RBC QN AUTO: 29.3 PG (ref 26.6–33)
MCHC RBC AUTO-ENTMCNC: 33.6 G/DL (ref 31.5–35.7)
MCV RBC AUTO: 87.2 FL (ref 79–97)
MONOCYTES # BLD AUTO: 0.58 10*3/MM3 (ref 0.1–0.9)
MONOCYTES NFR BLD AUTO: 5.9 % (ref 5–12)
NEUTROPHILS NFR BLD AUTO: 7.37 10*3/MM3 (ref 1.7–7)
NEUTROPHILS NFR BLD AUTO: 75.2 % (ref 42.7–76)
NRBC BLD AUTO-RTO: 0 /100 WBC (ref 0–0.2)
PLATELET # BLD AUTO: 218 10*3/MM3 (ref 140–450)
PMV BLD AUTO: 11.4 FL (ref 6–12)
RBC # BLD AUTO: 3.21 10*6/MM3 (ref 3.77–5.28)
WBC NRBC COR # BLD AUTO: 9.81 10*3/MM3 (ref 3.4–10.8)

## 2025-05-05 PROCEDURE — 82607 VITAMIN B-12: CPT

## 2025-05-05 PROCEDURE — 80061 LIPID PANEL: CPT

## 2025-05-05 PROCEDURE — 82306 VITAMIN D 25 HYDROXY: CPT

## 2025-05-05 PROCEDURE — 80050 GENERAL HEALTH PANEL: CPT

## 2025-05-05 PROCEDURE — 86706 HEP B SURFACE ANTIBODY: CPT

## 2025-05-05 PROCEDURE — 82746 ASSAY OF FOLIC ACID SERUM: CPT

## 2025-05-05 PROCEDURE — 36415 COLL VENOUS BLD VENIPUNCTURE: CPT

## 2025-05-05 PROCEDURE — 82728 ASSAY OF FERRITIN: CPT

## 2025-05-05 PROCEDURE — 83540 ASSAY OF IRON: CPT

## 2025-05-05 PROCEDURE — 84466 ASSAY OF TRANSFERRIN: CPT

## 2025-05-05 RX ORDER — ATORVASTATIN CALCIUM 40 MG/1
40 TABLET, FILM COATED ORAL DAILY
Qty: 90 TABLET | Refills: 1 | Status: SHIPPED | OUTPATIENT
Start: 2025-05-05

## 2025-05-05 RX ORDER — LISINOPRIL 10 MG/1
10 TABLET ORAL DAILY
Qty: 90 TABLET | Refills: 1 | Status: SHIPPED | OUTPATIENT
Start: 2025-05-05

## 2025-05-05 NOTE — PROGRESS NOTES
Subjective     Chief Complaint  Diabetes    Subjective          Willow Coburn is a 60 y.o. female who presents today to [facility] for follow up.    HPI:   Diabetes      History of Present Illness  The patient is a 60-year-old female who presents for evaluation of diabetes, hypertension, cholesterol management, neuropathy, weight management, and cataracts.    She reports experiencing fluctuations in her weight, with periods of both weight loss and gain. Her diet varies, with some days including high-protein meals such as ellison and beef, while other days she consumes more carbohydrates. She typically eats lunch at school around 9:30 or 10:00 AM.     She has been diagnosed with diverticulitis during a colonoscopy performed by a gastroenterologist.    She has been diagnosed with cataracts and has been advised to undergo cataract surgery, but no further communication has been received regarding this matter. She got glasses and went back 6 months later because her vision was blurry. She thought something was wrong with the glasses, but they said she had cataracts.    She reports an improvement in her pain levels, although she experiences increased discomfort during rainy weather. The pain is predominantly localized to her shoulder blades, described as an itchy sensation rather than a typical pain. She has been informed that this is due to nerve involvement. Additionally, she notes a decrease in buttock pain.    She has not received any vaccinations, including the pneumonia, Tdap, influenza, or hepatitis B vaccines.    The 10-year ASCVD risk score (Lyndsey JOSHI, et al., 2019) is: 22.6%    Values used to calculate the score:      Age: 60 years      Sex: Female      Is Non- : Yes      Diabetic: Yes      Tobacco smoker: No      Systolic Blood Pressure: 148 mmHg      Is BP treated: Yes      HDL Cholesterol: 38 mg/dL      Total Cholesterol: 163 mg/dL    The  "following portions of the patient's history were reviewed and updated as appropriate: allergies, current medications, past family history, past medical history, past social history, past surgical history and problem list.    Objective     Objective     Allergy:   No Known Allergies     Current Medications:   Current Outpatient Medications   Medication Sig Dispense Refill   • Glucose Blood (Blood Glucose Test) strip Inject 1 each under the skin into the appropriate area as directed 2 (Two) Times a Day. 100 each 11   • Lancets Micro Thin 33G misc Inject 1 each under the skin into the appropriate area as directed Daily. 100 each 11   • lisinopril (PRINIVIL,ZESTRIL) 10 MG tablet Take 1 tablet by mouth Daily. 90 tablet 1   • metFORMIN (GLUCOPHAGE) 500 MG tablet Take 1 tablet by mouth 2 (Two) Times a Day With Meals. 180 tablet 1   • atorvastatin (LIPITOR) 40 MG tablet Take 1 tablet by mouth Daily. 90 tablet 1     No current facility-administered medications for this visit.       Past Medical History:  Past Medical History:   Diagnosis Date   • Carpal tunnel syndrome    • Uterine fibroid        Past Surgical History:  Past Surgical History:   Procedure Laterality Date   • BREAST BIOPSY Left     needle biopsy   • CHOLECYSTECTOMY     • HYSTERECTOMY         Social History:  Social History     Socioeconomic History   • Marital status: Single   Tobacco Use   • Smoking status: Never     Passive exposure: Never   • Smokeless tobacco: Never   Vaping Use   • Vaping status: Never Used   Substance and Sexual Activity   • Alcohol use: Not Currently   • Drug use: Never   • Sexual activity: Not Currently     Partners: Male     Birth control/protection: Hysterectomy       Family History:  Family History   Problem Relation Age of Onset   • Leukemia Father    • Breast cancer Niece    • Cancer Niece    • Ovarian cancer Neg Hx          Vital Signs:   /80   Pulse 76   Temp 98 °F (36.7 °C) (Infrared)   Ht 162.6 cm (64.02\")   Wt 58.5 " kg (129 lb)   BMI 22.13 kg/m²      Physical Exam:  Physical Exam  Vitals reviewed.   Constitutional:       Appearance: She is not ill-appearing.   Cardiovascular:      Rate and Rhythm: Normal rate.      Pulses: Normal pulses.   Pulmonary:      Effort: Pulmonary effort is normal.      Breath sounds: Normal breath sounds.   Neurological:      General: No focal deficit present.      Mental Status: She is alert. Mental status is at baseline.   Psychiatric:         Mood and Affect: Mood normal.         Behavior: Behavior normal.         Thought Content: Thought content normal.         Judgment: Judgment normal.             PHQ-9 Score  PHQ-9 Total Score:      Lab Review  Office Visit on 05/05/2025   Component Date Value Ref Range Status   • Hemoglobin A1C 05/05/2025 5.3  4.5 - 5.7 % Final   • Lot Number 05/05/2025 [id number]   Final   • Expiration Date 05/05/2025 11/19/26   Final   Lab on 03/04/2025   Component Date Value Ref Range Status   • Glucose 03/04/2025 116 (H)  65 - 99 mg/dL Final   • BUN 03/04/2025 14  6 - 20 mg/dL Final   • Creatinine 03/04/2025 1.39 (H)  0.57 - 1.00 mg/dL Final   • Sodium 03/04/2025 148 (H)  136 - 145 mmol/L Final   • Potassium 03/04/2025 4.3  3.5 - 5.2 mmol/L Final   • Chloride 03/04/2025 112 (H)  98 - 107 mmol/L Final   • CO2 03/04/2025 27.0  22.0 - 29.0 mmol/L Final   • Calcium 03/04/2025 9.4  8.6 - 10.5 mg/dL Final   • BUN/Creatinine Ratio 03/04/2025 10.1  7.0 - 25.0 Final   • Anion Gap 03/04/2025 9.0  5.0 - 15.0 mmol/L Final   • eGFR 03/04/2025 44.1 (L)  >60.0 mL/min/1.73 Final        Radiology Results        Assessment / Plan         Assessment and Plan   Diagnoses and all orders for this visit:    1. Type 2 diabetes mellitus without complication, without long-term current use of insulin (Primary)  -     POC Glycosylated Hemoglobin (Hb A1C)  -     atorvastatin (LIPITOR) 40 MG tablet; Take 1 tablet by mouth Daily.  Dispense: 90 tablet; Refill: 1  -     Ambulatory Referral for Diabetic  Eye Exam-Ophthalmology    2. Primary hypertension  -     Comprehensive Metabolic Panel; Future  -     lisinopril (PRINIVIL,ZESTRIL) 10 MG tablet; Take 1 tablet by mouth Daily.  Dispense: 90 tablet; Refill: 1    3. Vitamin D deficiency  -     Vitamin D,25-Hydroxy; Future  -     Vitamin B12; Future    4. Iron deficiency anemia secondary to inadequate dietary iron intake  -     CBC & Differential; Future  -     TSH Rfx On Abnormal To Free T4; Future  -     Folate; Future  -     Ferritin; Future  -     Iron Profile; Future    5. Stage 3b chronic kidney disease  -     Comprehensive Metabolic Panel; Future    6. Mixed hyperlipidemia  -     atorvastatin (LIPITOR) 40 MG tablet; Take 1 tablet by mouth Daily.  Dispense: 90 tablet; Refill: 1  -     Comprehensive Metabolic Panel; Future  -     Lipid Panel; Future  -     TSH Rfx On Abnormal To Free T4; Future    7. Immunity status testing  -     Hepatitis B Surface Antibody; Future        Assessment & Plan  1. Diabetes Mellitus.  - A1c has improved from 7.0 three months ago to 5.3 today, indicating effective management with metformin.  - Physical exam findings show stable heart and lung sounds.  - Referral to an ophthalmologist will be made for a comprehensive eye examination due to diabetes diagnosis. If no call is received within a week, contact the office.  - Continue current metformin regimen.    2. Hypertension.  - Blood pressure is slightly elevated.  - Physical exam findings show stable heart and lung sounds.  - Increase lisinopril dosage from 5 mg to 10 mg to better manage blood pressure and protect kidneys and heart.  - Prescription Drug Monitoring Program was reviewed.    3. Hyperlipidemia.  - Initiate low-dose cholesterol medication to prevent arterial blockages, especially given diabetes diagnosis.  - Physical exam findings show stable heart and lung sounds.  - Discussed the importance of cholesterol management in diabetes care.  - Prescription for cholesterol  medication sent to pharmacy.    4. Iron Deficiency Anemia.  - Previous iron levels were low.  - Repeat blood test will be conducted today to assess current iron and hemoglobin levels.  - Depending on results, an iron infusion may be necessary.  - Blood test ordered before leaving the building.    5. Neuropathy.  - Reports improvement in pain but still experiences discomfort, particularly when the weather is rainy.  - Physical exam findings show stable heart and lung sounds.  - Continue current pain management plan and follow up with physical therapy as needed.  - Referral to physical therapy confirmed.    6. Weight Management.  - Weight has been fluctuating but remains within a stable range (138 lbs in 11/2024 and 129 lbs today).  - Physical exam findings show stable heart and lung sounds.  - Advised to maintain a balanced diet with adequate protein intake at each meal. Protein levels will be checked today.  - Blood test ordered to check protein levels.    7. Cataracts.  - Reported having cataracts and needing surgery but has not been contacted for the procedure.  - Referral to an ophthalmologist will be made to address this issue.  - Discussed the need for a comprehensive eye examination due to diabetes diagnosis.  - Referral for cataract surgery sent to ophthalmologist.    8. Health Maintenance.  - Due for pneumonia and Tdap vaccines.  - Immunity to hepatitis B will be checked during today's blood test.  - Refills for all medications have been sent to pharmacy.  - Blood test ordered to check hepatitis B immunity.    Follow-up  The patient will follow up in 3 months.      Discussed possible differential diagnoses, testing, treatment, recommended non-pharmacological interventions, risks, warning signs to monitor for that would indicate need for follow-up in clinic or ER. If no improvement with these regimens or you have new or worsening symptoms follow-up. Patient verbalizes understanding and agreement with plan of  care. Denies further needs or concerns.     Patient was given instructions and counseling regarding her condition and for health maintenance advised.    BMI is within normal parameters. No other follow-up for BMI required.      Health Maintenance  Health Maintenance:   Health Maintenance Due   Topic Date Due   • DIABETIC EYE EXAM  Never done        Meds ordered during this visit  New Medications Ordered This Visit   Medications   • atorvastatin (LIPITOR) 40 MG tablet     Sig: Take 1 tablet by mouth Daily.     Dispense:  90 tablet     Refill:  1   • lisinopril (PRINIVIL,ZESTRIL) 10 MG tablet     Sig: Take 1 tablet by mouth Daily.     Dispense:  90 tablet     Refill:  1       Meds stopped during this visit:  Medications Discontinued During This Encounter   Medication Reason   • methocarbamol (ROBAXIN) 500 MG tablet    • vitamin D (ERGOCALCIFEROL) 1.25 MG (38214 UT) capsule capsule    • lisinopril (PRINIVIL,ZESTRIL) 5 MG tablet Reorder        Visit Diagnoses    ICD-10-CM ICD-9-CM   1. Type 2 diabetes mellitus without complication, without long-term current use of insulin  E11.9 250.00   2. Primary hypertension  I10 401.9   3. Vitamin D deficiency  E55.9 268.9   4. Iron deficiency anemia secondary to inadequate dietary iron intake  D50.8 280.1   5. Stage 3b chronic kidney disease  N18.32 585.3   6. Mixed hyperlipidemia  E78.2 272.2   7. Immunity status testing  Z01.84 V72.61       Patient was given instructions and counseling regarding her condition or for health maintenance advice. Please see specific information pulled into the AVS if appropriate.     Follow Up   Return in about 3 months (around 8/5/2025) for followup HTN, DM, CKD .      Patient or patient representative verbalized consent for the use of Ambient Listening during the visit with  Sondra Valencia DO for chart documentation. 5/5/2025  15:12 EDT      This document has been electronically signed by Sondra Valencia DO   May 5, 2025 15:34 EDT    Dictated  Utilizing Dragon Dictation: Part of this note may be an electronic transcription/translation of spoken language to printed text using the Dragon Dictation System.    Sondra Valencia D.O.  [facility] [place]

## 2025-05-06 ENCOUNTER — TREATMENT (OUTPATIENT)
Dept: PHYSICAL THERAPY | Facility: CLINIC | Age: 60
End: 2025-05-06
Payer: COMMERCIAL

## 2025-05-06 ENCOUNTER — RESULTS FOLLOW-UP (OUTPATIENT)
Dept: LAB | Facility: HOSPITAL | Age: 60
End: 2025-05-06
Payer: COMMERCIAL

## 2025-05-06 DIAGNOSIS — D50.8 IRON DEFICIENCY ANEMIA SECONDARY TO INADEQUATE DIETARY IRON INTAKE: Primary | ICD-10-CM

## 2025-05-06 DIAGNOSIS — R42 DIZZINESS: Primary | ICD-10-CM

## 2025-05-06 DIAGNOSIS — K90.9 IRON MALABSORPTION: ICD-10-CM

## 2025-05-06 LAB
25(OH)D3 SERPL-MCNC: 28 NG/ML (ref 30–100)
ALBUMIN SERPL-MCNC: 3.9 G/DL (ref 3.5–5.2)
ALBUMIN/GLOB SERPL: 1.4 G/DL
ALP SERPL-CCNC: 56 U/L (ref 39–117)
ALT SERPL W P-5'-P-CCNC: 10 U/L (ref 1–33)
ANION GAP SERPL CALCULATED.3IONS-SCNC: 15 MMOL/L (ref 5–15)
AST SERPL-CCNC: 15 U/L (ref 1–32)
BILIRUB SERPL-MCNC: 0.2 MG/DL (ref 0–1.2)
BUN SERPL-MCNC: 19 MG/DL (ref 6–20)
BUN/CREAT SERPL: 13.8 (ref 7–25)
CALCIUM SPEC-SCNC: 9.1 MG/DL (ref 8.6–10.5)
CHLORIDE SERPL-SCNC: 109 MMOL/L (ref 98–107)
CHOLEST SERPL-MCNC: 157 MG/DL (ref 0–200)
CO2 SERPL-SCNC: 22 MMOL/L (ref 22–29)
CREAT SERPL-MCNC: 1.38 MG/DL (ref 0.57–1)
EGFRCR SERPLBLD CKD-EPI 2021: 44.5 ML/MIN/1.73
FERRITIN SERPL-MCNC: 276 NG/ML (ref 13–150)
FOLATE SERPL-MCNC: 9.87 NG/ML (ref 4.78–24.2)
GLOBULIN UR ELPH-MCNC: 2.7 GM/DL
GLUCOSE SERPL-MCNC: 117 MG/DL (ref 65–99)
HBV SURFACE AB SER RIA-ACNC: NORMAL
HDLC SERPL-MCNC: 50 MG/DL (ref 40–60)
IRON 24H UR-MRATE: 27 MCG/DL (ref 37–145)
IRON SATN MFR SERPL: 9 % (ref 20–50)
LDLC SERPL CALC-MCNC: 82 MG/DL (ref 0–100)
LDLC/HDLC SERPL: 1.56 {RATIO}
POTASSIUM SERPL-SCNC: 4 MMOL/L (ref 3.5–5.2)
PROT SERPL-MCNC: 6.6 G/DL (ref 6–8.5)
SODIUM SERPL-SCNC: 146 MMOL/L (ref 136–145)
TIBC SERPL-MCNC: 313 MCG/DL (ref 298–536)
TRANSFERRIN SERPL-MCNC: 210 MG/DL (ref 200–360)
TRIGL SERPL-MCNC: 145 MG/DL (ref 0–150)
TSH SERPL DL<=0.05 MIU/L-ACNC: 0.89 UIU/ML (ref 0.27–4.2)
VIT B12 BLD-MCNC: 312 PG/ML (ref 211–946)
VLDLC SERPL-MCNC: 25 MG/DL (ref 5–40)

## 2025-05-06 PROCEDURE — 95992 CANALITH REPOSITIONING PROC: CPT | Performed by: PHYSICAL THERAPIST

## 2025-05-06 PROCEDURE — 97161 PT EVAL LOW COMPLEX 20 MIN: CPT | Performed by: PHYSICAL THERAPIST

## 2025-05-06 PROCEDURE — 97530 THERAPEUTIC ACTIVITIES: CPT | Performed by: PHYSICAL THERAPIST

## 2025-05-06 RX ORDER — FAMOTIDINE 10 MG/ML
20 INJECTION, SOLUTION INTRAVENOUS AS NEEDED
OUTPATIENT
Start: 2025-05-06

## 2025-05-06 RX ORDER — DIPHENHYDRAMINE HYDROCHLORIDE 50 MG/ML
50 INJECTION, SOLUTION INTRAMUSCULAR; INTRAVENOUS AS NEEDED
OUTPATIENT
Start: 2025-05-06

## 2025-05-06 RX ORDER — SODIUM CHLORIDE 9 MG/ML
20 INJECTION, SOLUTION INTRAVENOUS ONCE
OUTPATIENT
Start: 2025-05-06

## 2025-05-06 RX ORDER — CHOLECALCIFEROL (VITAMIN D3) 50 MCG
2000 TABLET ORAL DAILY
Qty: 30 TABLET | Refills: 5 | Status: SHIPPED | OUTPATIENT
Start: 2025-05-06

## 2025-05-06 RX ORDER — HYDROCORTISONE SODIUM SUCCINATE 100 MG/2ML
100 INJECTION INTRAMUSCULAR; INTRAVENOUS AS NEEDED
OUTPATIENT
Start: 2025-05-06

## 2025-05-06 NOTE — PROGRESS NOTES
Physical Therapy Initial Evaluation and Plan of Care    [facility] [place]  [address]  [place], [place] [address] [phone]    Patient: Willow Coburn   : 1965  Diagnosis/ICD-10 Code:  No primary diagnosis found.  Referring practitioner: Gurinder Karimi MD    Subjective Evaluation    History of Present Illness  Date of onset: 2025  Mechanism of injury: Chronic    Subjective comment: Had an acute onset of dizziness starting 2 weeks ago.  Has left sided headaches and left shoulder pain.  No changes in vision.  Denies dipoplia and drop attacks.  Dizziness occurs when looking up, lying down in bed and turning over in bed.  Dizziness lasts less than 1 minute.  Patient Occupation: [employer]   Precautions and Work Restrictions: noneQuality of life: excellent    Pain  Relieving factors: change in position  Progression: no change    Patient Goals  Patient goal: Get rid of dizziness       *DHI:  12  Objective          Ambulation     Comments   *(+) [place] Sara->upbeating nystagmus in R rot/ext        Assessment & Plan       Assessment  Impairments: activity intolerance   Functional limitations: (Changing position  Lying down  Looking up  )  Assessment details: Ms. Coburn is a very pleasant 60-year-old female who presents to physical therapy with a recent onset of dizziness.  Dizziness occurs with change in position.  Symptoms last less than 1 minute.  Reproducible dizziness with [place] Sara.  Therefore, proceeded with Epley maneuver.  Had up beating nystagmus and right cervical spine rotation extension.  Provided home instructions to follow-up for the next 48 hours.  After 48 hours of avoiding provocative positions and sleeping incline.  Ms. Coburn can return to normal daily activities without restriction.  Will be happy to follow-up if symptoms or not improved.  Prognosis: good    Goals  Plan Goals: STGs:  1.)  Have no dizziness with  cervical spine extension in 4 weeks.  2.)  Have no dizziness with turning over in bed in 6 weeks.  LTGs:  1.)  Have no dizziness with quick changes in position in 8 weeks.    Plan  Therapy options: will be seen for skilled therapy services  Planned therapy interventions: neuromuscular re-education, motor coordination training and home exercise program  Frequency: 2x month  Duration in visits: 4  Duration in weeks: 8  Treatment plan discussed with: patient    Manual Therapy:         mins  54681;  Therapeutic Exercise:         mins  42501;     Neuromuscular Jackie:       mins  09035;    Therapeutic Activity:     10     mins  87438;     Gait Training:           mins  57509;     Ultrasound:          mins  58374;    Electrical Stimulation:         mins  55559 ( );  Dry Needling          mins self-pay  Canalith Repositioning __8__   mins 20110    Timed Treatment:   10   mins   Total Treatment:     40   mins    PT SIGNATURE: George Diaz, PT   DATE TREATMENT INITIATED: 5/6/2025    Initial Certification  Certification Period: 8/4/2025  I certify that the therapy services are furnished while this patient is under my care.  The services outlined above are required by this patient, and will be reviewed every 90 days.     PHYSICIAN: Gurinder Karimi MD  NPI: [id number]                                      DATE:    Please sign and return via fax to [phone].. Thank you, [facility].

## 2025-05-21 ENCOUNTER — TELEPHONE (OUTPATIENT)
Dept: FAMILY MEDICINE CLINIC | Facility: CLINIC | Age: 60
End: 2025-05-21
Payer: COMMERCIAL

## 2025-05-21 NOTE — TELEPHONE ENCOUNTER
HUB TO READ      LM for patient to let her know she has been scheduled for an iron infusion 5/28/25 @ 1:30. She will report to [address] [place] [address] Entrance C. If she needs to reschedule, she can call [phone]

## 2025-05-28 ENCOUNTER — HOSPITAL ENCOUNTER (OUTPATIENT)
Facility: HOSPITAL | Age: 60
Discharge: HOME | End: 2025-05-28
Payer: COMMERCIAL

## 2025-06-04 ENCOUNTER — HOSPITAL ENCOUNTER (OUTPATIENT)
Facility: HOSPITAL | Age: 60
Discharge: HOME | End: 2025-06-04
Admitting: FAMILY MEDICINE
Payer: COMMERCIAL

## 2025-06-04 VITALS
HEART RATE: 81 BPM | RESPIRATION RATE: 18 BRPM | WEIGHT: 125.2 LBS | HEIGHT: 64 IN | SYSTOLIC BLOOD PRESSURE: 172 MMHG | BODY MASS INDEX: 21.37 KG/M2 | DIASTOLIC BLOOD PRESSURE: 98 MMHG | TEMPERATURE: 99.1 F

## 2025-06-04 DIAGNOSIS — K90.9 IRON MALABSORPTION: ICD-10-CM

## 2025-06-04 DIAGNOSIS — D50.8 IRON DEFICIENCY ANEMIA SECONDARY TO INADEQUATE DIETARY IRON INTAKE: Primary | ICD-10-CM

## 2025-06-04 PROCEDURE — 25010000002 IRON SUCROSE PER 1 MG: Performed by: FAMILY MEDICINE

## 2025-06-04 PROCEDURE — 25810000003 SODIUM CHLORIDE 0.9 % SOLUTION: Performed by: FAMILY MEDICINE

## 2025-06-04 PROCEDURE — 96374 THER/PROPH/DIAG INJ IV PUSH: CPT

## 2025-06-04 RX ORDER — HYDROCORTISONE SODIUM SUCCINATE 100 MG/2ML
100 INJECTION INTRAMUSCULAR; INTRAVENOUS AS NEEDED
OUTPATIENT
Start: 2025-06-05

## 2025-06-04 RX ORDER — FAMOTIDINE 10 MG/ML
20 INJECTION, SOLUTION INTRAVENOUS AS NEEDED
OUTPATIENT
Start: 2025-06-05

## 2025-06-04 RX ORDER — DIPHENHYDRAMINE HYDROCHLORIDE 50 MG/ML
50 INJECTION, SOLUTION INTRAMUSCULAR; INTRAVENOUS AS NEEDED
Status: DISCONTINUED | OUTPATIENT
Start: 2025-06-04 | End: 2025-06-05 | Stop reason: HOSPADM

## 2025-06-04 RX ORDER — SODIUM CHLORIDE 9 MG/ML
20 INJECTION, SOLUTION INTRAVENOUS ONCE
Status: COMPLETED | OUTPATIENT
Start: 2025-06-04 | End: 2025-06-04

## 2025-06-04 RX ORDER — FAMOTIDINE 10 MG/ML
20 INJECTION, SOLUTION INTRAVENOUS AS NEEDED
Status: DISCONTINUED | OUTPATIENT
Start: 2025-06-04 | End: 2025-06-05 | Stop reason: HOSPADM

## 2025-06-04 RX ORDER — SODIUM CHLORIDE 9 MG/ML
20 INJECTION, SOLUTION INTRAVENOUS ONCE
OUTPATIENT
Start: 2025-06-05

## 2025-06-04 RX ORDER — HYDROCORTISONE SODIUM SUCCINATE 100 MG/2ML
100 INJECTION INTRAMUSCULAR; INTRAVENOUS AS NEEDED
Status: DISCONTINUED | OUTPATIENT
Start: 2025-06-04 | End: 2025-06-05 | Stop reason: HOSPADM

## 2025-06-04 RX ORDER — DIPHENHYDRAMINE HYDROCHLORIDE 50 MG/ML
50 INJECTION, SOLUTION INTRAMUSCULAR; INTRAVENOUS AS NEEDED
OUTPATIENT
Start: 2025-06-05

## 2025-06-04 RX ADMIN — IRON SUCROSE 200 MG: 20 INJECTION, SOLUTION INTRAVENOUS at 13:42

## 2025-06-04 RX ADMIN — SODIUM CHLORIDE 20 ML/HR: 9 INJECTION, SOLUTION INTRAVENOUS at 13:36

## 2025-06-10 ENCOUNTER — TELEPHONE (OUTPATIENT)
Dept: PHYSICAL THERAPY | Facility: CLINIC | Age: 60
End: 2025-06-10

## 2025-06-10 NOTE — TELEPHONE ENCOUNTER
"  Caller: Willow Coburn \"Mi\"    Relationship: Self    What was the call regarding: GOING OUT OF TOWN  "

## 2025-06-11 ENCOUNTER — HOSPITAL ENCOUNTER (OUTPATIENT)
Facility: HOSPITAL | Age: 60
Discharge: HOME | End: 2025-06-11
Admitting: FAMILY MEDICINE
Payer: COMMERCIAL

## 2025-06-11 VITALS
WEIGHT: 125.2 LBS | RESPIRATION RATE: 18 BRPM | TEMPERATURE: 98.9 F | DIASTOLIC BLOOD PRESSURE: 94 MMHG | HEART RATE: 83 BPM | BODY MASS INDEX: 21.37 KG/M2 | HEIGHT: 64 IN | SYSTOLIC BLOOD PRESSURE: 151 MMHG

## 2025-06-11 DIAGNOSIS — K90.9 IRON MALABSORPTION: ICD-10-CM

## 2025-06-11 DIAGNOSIS — D50.8 IRON DEFICIENCY ANEMIA SECONDARY TO INADEQUATE DIETARY IRON INTAKE: Primary | ICD-10-CM

## 2025-06-11 PROCEDURE — 96374 THER/PROPH/DIAG INJ IV PUSH: CPT

## 2025-06-11 PROCEDURE — 96365 THER/PROPH/DIAG IV INF INIT: CPT

## 2025-06-11 PROCEDURE — 25810000003 SODIUM CHLORIDE 0.9 % SOLUTION: Performed by: FAMILY MEDICINE

## 2025-06-11 PROCEDURE — 25010000002 IRON SUCROSE PER 1 MG: Performed by: FAMILY MEDICINE

## 2025-06-11 RX ORDER — FAMOTIDINE 10 MG/ML
20 INJECTION, SOLUTION INTRAVENOUS AS NEEDED
Status: DISCONTINUED | OUTPATIENT
Start: 2025-06-11 | End: 2025-06-12 | Stop reason: HOSPADM

## 2025-06-11 RX ORDER — DIPHENHYDRAMINE HYDROCHLORIDE 50 MG/ML
50 INJECTION, SOLUTION INTRAMUSCULAR; INTRAVENOUS AS NEEDED
Status: DISCONTINUED | OUTPATIENT
Start: 2025-06-11 | End: 2025-06-12 | Stop reason: HOSPADM

## 2025-06-11 RX ORDER — HYDROCORTISONE SODIUM SUCCINATE 100 MG/2ML
100 INJECTION INTRAMUSCULAR; INTRAVENOUS AS NEEDED
Status: DISCONTINUED | OUTPATIENT
Start: 2025-06-11 | End: 2025-06-12 | Stop reason: HOSPADM

## 2025-06-11 RX ORDER — SODIUM CHLORIDE 9 MG/ML
20 INJECTION, SOLUTION INTRAVENOUS ONCE
Status: COMPLETED | OUTPATIENT
Start: 2025-06-11 | End: 2025-06-11

## 2025-06-11 RX ORDER — SODIUM CHLORIDE 9 MG/ML
20 INJECTION, SOLUTION INTRAVENOUS ONCE
OUTPATIENT
Start: 2025-06-12

## 2025-06-11 RX ORDER — DIPHENHYDRAMINE HYDROCHLORIDE 50 MG/ML
50 INJECTION, SOLUTION INTRAMUSCULAR; INTRAVENOUS AS NEEDED
OUTPATIENT
Start: 2025-06-12

## 2025-06-11 RX ORDER — FAMOTIDINE 10 MG/ML
20 INJECTION, SOLUTION INTRAVENOUS AS NEEDED
OUTPATIENT
Start: 2025-06-12

## 2025-06-11 RX ORDER — HYDROCORTISONE SODIUM SUCCINATE 100 MG/2ML
100 INJECTION INTRAMUSCULAR; INTRAVENOUS AS NEEDED
OUTPATIENT
Start: 2025-06-12

## 2025-06-11 RX ADMIN — IRON SUCROSE 200 MG: 20 INJECTION, SOLUTION INTRAVENOUS at 13:43

## 2025-06-11 RX ADMIN — SODIUM CHLORIDE 20 ML/HR: 9 INJECTION, SOLUTION INTRAVENOUS at 13:39

## 2025-06-16 ENCOUNTER — OFFICE VISIT (OUTPATIENT)
Dept: PAIN MEDICINE | Facility: CLINIC | Age: 60
End: 2025-06-16
Payer: COMMERCIAL

## 2025-06-16 VITALS — BODY MASS INDEX: 21.85 KG/M2 | WEIGHT: 128 LBS | HEIGHT: 64 IN

## 2025-06-16 DIAGNOSIS — M53.3 PAIN OF LEFT SACROILIAC JOINT: ICD-10-CM

## 2025-06-16 DIAGNOSIS — M89.8X1 PERISCAPULAR PAIN: ICD-10-CM

## 2025-06-16 DIAGNOSIS — M53.3 SACROILIAC JOINT PAIN: Primary | ICD-10-CM

## 2025-06-16 PROCEDURE — 99213 OFFICE O/P EST LOW 20 MIN: CPT

## 2025-06-16 NOTE — PROGRESS NOTES
Referring Physician: No referring provider defined for this encounter.    Primary Physician: Sondra Valencia DO    CHIEF COMPLAINT or REASON FOR VISIT: Back Pain and Follow-up      Initial history of present illness on 01/13/2025:  Ms. Willow Coburn is 60 y.o. female who presents as a new patient referral for evaluation treatment of chronic axial low back pain with radiation to the left buttock.  Patient identifies a focal source of pain at the left lumbosacral junction.  This started approximately 8 months ago.  Over that timeframe she has additionally had some significant weight loss of approximately 100 pounds without any known cause.  She believes this may be secondary to a recent diagnosis of diverticulitis.  This is being monitored and managed by her primary care physician Dr. Valencia.  She has undergone GI workup, scopes etc.  She denies any history of spinal surgery or intervention.  She denies any history of physical therapy.  She has trialed acetaminophen with mild benefit, NSAIDs with modest benefit.  Patient denies any lower extremity motor dysfunction or radiation beyond the buttock.  She denies any saddle anesthesia.  She does endorse unexplained weight loss.  She additionally complains of bilateral periscapular thoracic back pain.    Interval history:  Patient returns to clinic today after undergoing conservative measures for chronic low back pain.  She reports excellent relief from physical therapy.  She denies any specific pain today.  Overall, she is doing well.  She is no longer taking methocarbamol.  No new injuries or issues.  Patient denies any bowel or bladder dysfunction, lower extremity weakness, new onset saddle anesthesia or unexplained weight loss    Interventions:      Objective Pain Scoring:   BRIEF PAIN INVENTORY:  Total score:   Pain Score    06/16/25 0908   PainSc: 0-No pain      PHQ-2: 0  PHQ-9:    Opioid Risk Tool:         Review of Systems:   ROS negative except as otherwise  "noted     Past Medical History:   Past Medical History:   Diagnosis Date    Carpal tunnel syndrome     Uterine fibroid          Past Surgical History:   Past Surgical History:   Procedure Laterality Date    BREAST BIOPSY Left     needle biopsy    CHOLECYSTECTOMY      HYSTERECTOMY           Family History   Family History   Problem Relation Age of Onset    Leukemia Father     Breast cancer Niece     Cancer Niece     Ovarian cancer Neg Hx          Social History   Social History     Socioeconomic History    Marital status: Single   Tobacco Use    Smoking status: Never     Passive exposure: Never    Smokeless tobacco: Never   Vaping Use    Vaping status: Never Used   Substance and Sexual Activity    Alcohol use: Not Currently    Drug use: Never    Sexual activity: Not Currently     Partners: Male     Birth control/protection: Hysterectomy        Medications:     Current Outpatient Medications:     atorvastatin (LIPITOR) 40 MG tablet, Take 1 tablet by mouth Daily., Disp: 90 tablet, Rfl: 1    Cholecalciferol (Vitamin D) 50 MCG (2000 UT) tablet, Take 1 tablet by mouth Daily., Disp: 30 tablet, Rfl: 5    Glucose Blood (Blood Glucose Test) strip, Inject 1 each under the skin into the appropriate area as directed 2 (Two) Times a Day., Disp: 100 each, Rfl: 11    Lancets Micro Thin 33G misc, Inject 1 each under the skin into the appropriate area as directed Daily., Disp: 100 each, Rfl: 11    lisinopril (PRINIVIL,ZESTRIL) 10 MG tablet, Take 1 tablet by mouth Daily., Disp: 90 tablet, Rfl: 1    metFORMIN (GLUCOPHAGE) 500 MG tablet, Take 1 tablet by mouth 2 (Two) Times a Day With Meals., Disp: 180 tablet, Rfl: 0        Physical Exam:     Vitals:    06/16/25 0908   Weight: 58.1 kg (128 lb)   Height: 162.6 cm (64\")   PainSc: 0-No pain        General: Alert and oriented, No acute distress.   HEENT: Normocephalic, atraumatic.   Cardiovascular: No gross edema  Respiratory: Respirations are non-labored    Cervical Spine:   No masses or " atrophy  Range of motion - Flexion normal. Extension normal. Lateral rotation normal.   Palpation - nontender   Spurling's - negative     Thoracic Spine:   Inspection: no gross abnormality  Paraspinal muscle palpation: Tender bilaterally  Spinous process palpation: nontender    Lumbar Spine:   No masses or atrophy  Range of motion - Flexion normal. Extension normal.    Facet Loading: Negative bilaterally  Facet Palpation - Nontender   Peña finger/Gaenslen's/Cooper's/HARRY/Thigh thrust -positive left  Straight leg raise/slump test: Negative bilaterally  Multifidus toe-touch test:  TTP left piriformis  Negative FADIR test    Motor Exam:    Strength: Rate on 1-5 scale Right Left    C5-Elbow flexion, Deltoid 5/5  5/5    C6-Wrist extension 5/5  5/5    C7- Elbow / finger extension 5/5  5/5    C8- Finger flexion 5/5  5/5    T1- Intrinsics hand 5/5  5/5    Strength: Rate on 1-5 scale Right Left    L1/2- hip flexion 5/5  5/5    L3- knee extension 5/5  5/5    L4- ankle dorsiflexion 5/5  5/5    L5- great toe extension 5/5  5/5    S1- ankle plantarflexion 5/5  5/5    Sensory Exam: Full and equal sensation to light touch throughout.       Neurologic: Cranial Nerves II-XII are grossly intact.     Psychiatric: Cooperative.   Gait: Normal   Assistive Devices: None    Imaging Studies:   Results for orders placed during the hospital encounter of 12/12/24    MRI Lumbar Spine Without Contrast    Narrative  MRI LUMBAR SPINE WO CONTRAST    Date of Exam: 12/12/2024 3:41 PM EST    Indication: anterolisthesis.    Comparison: None available.    Technique:  Routine multiplanar/multisequence sequence images of the lumbar spine were obtained without contrast administration.    Findings: Lumbar vertebral bodies are normal normal. Disc space narrowing at L5-S1. Heterogeneous appearance of the bone marrow likely due to demineralization. No marrow edema. Benign hemangioma in the S1. Multiple T2 hyperintensities involving both  kidneys  consistent with small renal cysts. Colonic diverticulosis without evidence of diverticulitis. The conus medullaris terminates at the L2 vertebral body level. No cord signal abnormalities.    T12-L1: No significant spinal canal or foraminal narrowing.    L1-L2: No significant spinal canal or foraminal narrowing.    L2-L3: Disc bulge. Minimal canal stenosis. Minimal foraminal narrowing.    L3-L4: Disc bulge. Minimal canal stenosis. Minimal foraminal narrowing.    L4-L5: Disc bulge. Minimal canal stenosis. Minimal foraminal narrowing.    L5-S1: Facet disease and disc bulge. No canal stenosis. Mild foraminal narrowing.    Impression  Multilevel mild degenerative changes of the lumbar spine.      Electronically Signed: Geovanni Munoz MD  12/12/2024 10:16 PM EST  Workstation ID: [id number]        Independent review of radiographic imaging: Available for my interpretation is a lumbar MRI dated December 12, 2024 demonstrating L5/S1 degenerative disc disease with Modic 2 endplate changes; there is an S1 hemangioma; there is annular tear at the posterior L5/S1 disc; there is no high-grade canal stenosis; bilateral L5/S1 neuroforaminal stenosis.    Impression & Plan:       01/13/2025: Willow Coburn is a 60 y.o. female with past medical history significant for recent significant unexplained weight loss, who presents to the pain clinic for evaluation and treatment of chronic left-sided low back pain with radiation into the left buttock.  MRI interpretation as above.  Evaluation consistent with left-sided sacroiliac joint pain versus piriformis syndrome versus facet arthropathy.  We discussed diagnostic and therapeutic sacroiliac joint steroid injection. If injection provides sustained relief can continue with joint injections no more than three times a year. If at least two diagnostic and therapeutic injetions are performed and provide relief then can consider minimally invasive sacroiliac joint fusion.  I had a discussion with the  patient regarding the risks of the procedure including bleeding, infection, damage to surrounding structures.  We discussed the potential adverse effects of corticosteroid injection including flushing of the face, lipodystrophy, skin discoloration, elevated blood glucose, increased blood pressure.  Risks of frequent steroid administration include weight gain, hormonal changes, mood changes, osteoporosis.  3/3/2025: Patient canceled left SIJ.  Discontinue baclofen due to side effects.  Start methocarbamol.  May consider left SIJ if patient would like; she politely declines today  2025: Excellent relief from physical therapy.  Will continue.    1. Sacroiliac joint pain    2. Pain of left sacroiliac joint    3. Periscapular pain            PLAN:  1. Medication Recommendations: Recommend Voltaren topical, NSAIDs, Tylenol.  Can trial turmeric 500 mg twice daily if NSAID contraindicated.  Discontinue methocarbamol; patient no longer taking    2. Physical Therapy: Continue PT/HEP    3. Psychological: defer    4. Complementary and alternative (CAM) Therapies:     5. Labs/Diagnostic studies: None indicated     6. Imagin. Interventions: May consider left sacroiliac joint steroid injection if symptoms return  8. Referrals:    9. Records:  10. Lifestyle goals:    Follow-up as needed    [facility]  Fortino Rodriguez PA-C          Quality Metrics:

## 2025-06-18 ENCOUNTER — HOSPITAL ENCOUNTER (OUTPATIENT)
Facility: HOSPITAL | Age: 60
Discharge: HOME | End: 2025-06-18
Admitting: FAMILY MEDICINE
Payer: COMMERCIAL

## 2025-06-18 VITALS
DIASTOLIC BLOOD PRESSURE: 79 MMHG | BODY MASS INDEX: 22.06 KG/M2 | HEART RATE: 96 BPM | WEIGHT: 129.2 LBS | HEIGHT: 64 IN | TEMPERATURE: 99 F | SYSTOLIC BLOOD PRESSURE: 131 MMHG | RESPIRATION RATE: 18 BRPM

## 2025-06-18 DIAGNOSIS — K90.9 IRON MALABSORPTION: ICD-10-CM

## 2025-06-18 DIAGNOSIS — D50.8 IRON DEFICIENCY ANEMIA SECONDARY TO INADEQUATE DIETARY IRON INTAKE: Primary | ICD-10-CM

## 2025-06-18 PROCEDURE — 96374 THER/PROPH/DIAG INJ IV PUSH: CPT

## 2025-06-18 PROCEDURE — 25810000003 SODIUM CHLORIDE 0.9 % SOLUTION: Performed by: FAMILY MEDICINE

## 2025-06-18 PROCEDURE — 25010000002 IRON SUCROSE PER 1 MG: Performed by: FAMILY MEDICINE

## 2025-06-18 RX ORDER — SODIUM CHLORIDE 9 MG/ML
20 INJECTION, SOLUTION INTRAVENOUS ONCE
Status: COMPLETED | OUTPATIENT
Start: 2025-06-18 | End: 2025-06-18

## 2025-06-18 RX ORDER — HYDROCORTISONE SODIUM SUCCINATE 100 MG/2ML
100 INJECTION INTRAMUSCULAR; INTRAVENOUS AS NEEDED
OUTPATIENT
Start: 2025-06-19

## 2025-06-18 RX ORDER — FAMOTIDINE 10 MG/ML
20 INJECTION, SOLUTION INTRAVENOUS AS NEEDED
OUTPATIENT
Start: 2025-06-19

## 2025-06-18 RX ORDER — FAMOTIDINE 10 MG/ML
20 INJECTION, SOLUTION INTRAVENOUS AS NEEDED
Status: DISCONTINUED | OUTPATIENT
Start: 2025-06-18 | End: 2025-06-19 | Stop reason: HOSPADM

## 2025-06-18 RX ORDER — SODIUM CHLORIDE 9 MG/ML
20 INJECTION, SOLUTION INTRAVENOUS ONCE
OUTPATIENT
Start: 2025-06-19

## 2025-06-18 RX ORDER — HYDROCORTISONE SODIUM SUCCINATE 100 MG/2ML
100 INJECTION INTRAMUSCULAR; INTRAVENOUS AS NEEDED
Status: DISCONTINUED | OUTPATIENT
Start: 2025-06-18 | End: 2025-06-19 | Stop reason: HOSPADM

## 2025-06-18 RX ORDER — DIPHENHYDRAMINE HYDROCHLORIDE 50 MG/ML
50 INJECTION, SOLUTION INTRAMUSCULAR; INTRAVENOUS AS NEEDED
OUTPATIENT
Start: 2025-06-19

## 2025-06-18 RX ORDER — DIPHENHYDRAMINE HYDROCHLORIDE 50 MG/ML
50 INJECTION, SOLUTION INTRAMUSCULAR; INTRAVENOUS AS NEEDED
Status: DISCONTINUED | OUTPATIENT
Start: 2025-06-18 | End: 2025-06-19 | Stop reason: HOSPADM

## 2025-06-18 RX ADMIN — IRON SUCROSE 200 MG: 20 INJECTION, SOLUTION INTRAVENOUS at 13:41

## 2025-06-18 RX ADMIN — SODIUM CHLORIDE 20 ML/HR: 9 INJECTION, SOLUTION INTRAVENOUS at 13:40

## 2025-06-25 ENCOUNTER — HOSPITAL ENCOUNTER (OUTPATIENT)
Facility: HOSPITAL | Age: 60
Discharge: HOME | End: 2025-06-25
Admitting: FAMILY MEDICINE
Payer: COMMERCIAL

## 2025-06-25 VITALS
SYSTOLIC BLOOD PRESSURE: 129 MMHG | DIASTOLIC BLOOD PRESSURE: 81 MMHG | HEART RATE: 76 BPM | TEMPERATURE: 98.2 F | BODY MASS INDEX: 21.97 KG/M2 | WEIGHT: 128 LBS | RESPIRATION RATE: 14 BRPM

## 2025-06-25 DIAGNOSIS — K90.9 IRON MALABSORPTION: ICD-10-CM

## 2025-06-25 DIAGNOSIS — D50.8 IRON DEFICIENCY ANEMIA SECONDARY TO INADEQUATE DIETARY IRON INTAKE: Primary | ICD-10-CM

## 2025-06-25 PROCEDURE — 25810000003 SODIUM CHLORIDE 0.9 % SOLUTION: Performed by: FAMILY MEDICINE

## 2025-06-25 PROCEDURE — 96365 THER/PROPH/DIAG IV INF INIT: CPT

## 2025-06-25 PROCEDURE — 96374 THER/PROPH/DIAG INJ IV PUSH: CPT

## 2025-06-25 PROCEDURE — 25010000002 IRON SUCROSE PER 1 MG: Performed by: FAMILY MEDICINE

## 2025-06-25 RX ORDER — FAMOTIDINE 10 MG/ML
20 INJECTION, SOLUTION INTRAVENOUS AS NEEDED
Status: DISCONTINUED | OUTPATIENT
Start: 2025-06-25 | End: 2025-06-26 | Stop reason: HOSPADM

## 2025-06-25 RX ORDER — HYDROCORTISONE SODIUM SUCCINATE 100 MG/2ML
100 INJECTION INTRAMUSCULAR; INTRAVENOUS AS NEEDED
Status: DISCONTINUED | OUTPATIENT
Start: 2025-06-25 | End: 2025-06-26 | Stop reason: HOSPADM

## 2025-06-25 RX ORDER — DIPHENHYDRAMINE HYDROCHLORIDE 50 MG/ML
50 INJECTION, SOLUTION INTRAMUSCULAR; INTRAVENOUS AS NEEDED
OUTPATIENT
Start: 2025-06-26

## 2025-06-25 RX ORDER — DIPHENHYDRAMINE HYDROCHLORIDE 50 MG/ML
50 INJECTION, SOLUTION INTRAMUSCULAR; INTRAVENOUS AS NEEDED
Status: DISCONTINUED | OUTPATIENT
Start: 2025-06-25 | End: 2025-06-26 | Stop reason: HOSPADM

## 2025-06-25 RX ORDER — SODIUM CHLORIDE 9 MG/ML
20 INJECTION, SOLUTION INTRAVENOUS ONCE
OUTPATIENT
Start: 2025-06-26

## 2025-06-25 RX ORDER — HYDROCORTISONE SODIUM SUCCINATE 100 MG/2ML
100 INJECTION INTRAMUSCULAR; INTRAVENOUS AS NEEDED
OUTPATIENT
Start: 2025-06-26

## 2025-06-25 RX ORDER — SODIUM CHLORIDE 9 MG/ML
20 INJECTION, SOLUTION INTRAVENOUS ONCE
Status: COMPLETED | OUTPATIENT
Start: 2025-06-25 | End: 2025-06-25

## 2025-06-25 RX ORDER — FAMOTIDINE 10 MG/ML
20 INJECTION, SOLUTION INTRAVENOUS AS NEEDED
OUTPATIENT
Start: 2025-06-26

## 2025-06-25 RX ADMIN — SODIUM CHLORIDE 20 ML/HR: 9 INJECTION, SOLUTION INTRAVENOUS at 13:45

## 2025-06-25 RX ADMIN — IRON SUCROSE 200 MG: 20 INJECTION, SOLUTION INTRAVENOUS at 13:50

## 2025-07-02 ENCOUNTER — HOSPITAL ENCOUNTER (OUTPATIENT)
Facility: HOSPITAL | Age: 60
Discharge: HOME | End: 2025-07-02
Admitting: FAMILY MEDICINE
Payer: COMMERCIAL

## 2025-07-02 VITALS
HEART RATE: 76 BPM | WEIGHT: 124.8 LBS | TEMPERATURE: 98.7 F | SYSTOLIC BLOOD PRESSURE: 163 MMHG | DIASTOLIC BLOOD PRESSURE: 96 MMHG | BODY MASS INDEX: 21.31 KG/M2 | RESPIRATION RATE: 18 BRPM | HEIGHT: 64 IN

## 2025-07-02 DIAGNOSIS — D50.8 IRON DEFICIENCY ANEMIA SECONDARY TO INADEQUATE DIETARY IRON INTAKE: Primary | ICD-10-CM

## 2025-07-02 DIAGNOSIS — K90.9 IRON MALABSORPTION: ICD-10-CM

## 2025-07-02 PROCEDURE — 25010000002 IRON SUCROSE PER 1 MG: Performed by: FAMILY MEDICINE

## 2025-07-02 PROCEDURE — 96365 THER/PROPH/DIAG IV INF INIT: CPT

## 2025-07-02 PROCEDURE — 96374 THER/PROPH/DIAG INJ IV PUSH: CPT

## 2025-07-02 PROCEDURE — 25810000003 SODIUM CHLORIDE 0.9 % SOLUTION: Performed by: FAMILY MEDICINE

## 2025-07-02 RX ORDER — HYDROCORTISONE SODIUM SUCCINATE 100 MG/2ML
100 INJECTION INTRAMUSCULAR; INTRAVENOUS AS NEEDED
Status: DISCONTINUED | OUTPATIENT
Start: 2025-07-02 | End: 2025-07-03 | Stop reason: HOSPADM

## 2025-07-02 RX ORDER — SODIUM CHLORIDE 9 MG/ML
20 INJECTION, SOLUTION INTRAVENOUS ONCE
Status: CANCELLED | OUTPATIENT
Start: 2025-07-02

## 2025-07-02 RX ORDER — DIPHENHYDRAMINE HYDROCHLORIDE 50 MG/ML
50 INJECTION, SOLUTION INTRAMUSCULAR; INTRAVENOUS AS NEEDED
Status: DISCONTINUED | OUTPATIENT
Start: 2025-07-02 | End: 2025-07-03 | Stop reason: HOSPADM

## 2025-07-02 RX ORDER — DIPHENHYDRAMINE HYDROCHLORIDE 50 MG/ML
50 INJECTION, SOLUTION INTRAMUSCULAR; INTRAVENOUS AS NEEDED
OUTPATIENT
Start: 2025-07-02

## 2025-07-02 RX ORDER — FAMOTIDINE 10 MG/ML
20 INJECTION, SOLUTION INTRAVENOUS AS NEEDED
OUTPATIENT
Start: 2025-07-02

## 2025-07-02 RX ORDER — SODIUM CHLORIDE 9 MG/ML
20 INJECTION, SOLUTION INTRAVENOUS ONCE
Status: COMPLETED | OUTPATIENT
Start: 2025-07-02 | End: 2025-07-02

## 2025-07-02 RX ORDER — FAMOTIDINE 10 MG/ML
20 INJECTION, SOLUTION INTRAVENOUS AS NEEDED
Status: DISCONTINUED | OUTPATIENT
Start: 2025-07-02 | End: 2025-07-03 | Stop reason: HOSPADM

## 2025-07-02 RX ORDER — HYDROCORTISONE SODIUM SUCCINATE 100 MG/2ML
100 INJECTION INTRAMUSCULAR; INTRAVENOUS AS NEEDED
OUTPATIENT
Start: 2025-07-02

## 2025-07-02 RX ADMIN — IRON SUCROSE 200 MG: 20 INJECTION, SOLUTION INTRAVENOUS at 13:40

## 2025-07-02 RX ADMIN — SODIUM CHLORIDE 20 ML/HR: 9 INJECTION, SOLUTION INTRAVENOUS at 13:36

## 2025-07-10 ENCOUNTER — HOSPITAL ENCOUNTER (OUTPATIENT)
Dept: MAMMOGRAPHY | Facility: HOSPITAL | Age: 60
Discharge: HOME | End: 2025-07-10
Admitting: RADIOLOGY
Payer: COMMERCIAL

## 2025-07-10 DIAGNOSIS — R92.8 ABNORMAL MAMMOGRAM: ICD-10-CM

## 2025-07-10 PROCEDURE — 77066 DX MAMMO INCL CAD BI: CPT | Performed by: RADIOLOGY

## 2025-07-10 PROCEDURE — 77066 DX MAMMO INCL CAD BI: CPT

## 2025-07-24 ENCOUNTER — TRANSCRIBE ORDERS (OUTPATIENT)
Dept: ADMINISTRATIVE | Facility: HOSPITAL | Age: 60
End: 2025-07-24
Payer: COMMERCIAL

## 2025-07-24 DIAGNOSIS — E61.1 IRON DEFICIENCY: Primary | ICD-10-CM

## 2025-08-05 ENCOUNTER — LAB (OUTPATIENT)
Dept: LAB | Facility: HOSPITAL | Age: 60
End: 2025-08-05
Payer: COMMERCIAL

## 2025-08-05 ENCOUNTER — OFFICE VISIT (OUTPATIENT)
Dept: FAMILY MEDICINE CLINIC | Facility: CLINIC | Age: 60
End: 2025-08-05
Payer: COMMERCIAL

## 2025-08-05 VITALS
HEART RATE: 80 BPM | BODY MASS INDEX: 21.59 KG/M2 | SYSTOLIC BLOOD PRESSURE: 140 MMHG | OXYGEN SATURATION: 99 % | DIASTOLIC BLOOD PRESSURE: 98 MMHG | TEMPERATURE: 98.4 F | WEIGHT: 125.8 LBS

## 2025-08-05 DIAGNOSIS — E78.2 MIXED HYPERLIPIDEMIA: ICD-10-CM

## 2025-08-05 DIAGNOSIS — R40.4 EPISODE OF ALTERED CONSCIOUSNESS: ICD-10-CM

## 2025-08-05 DIAGNOSIS — D50.8 IRON DEFICIENCY ANEMIA SECONDARY TO INADEQUATE DIETARY IRON INTAKE: ICD-10-CM

## 2025-08-05 DIAGNOSIS — R63.4 UNEXPLAINED WEIGHT LOSS: ICD-10-CM

## 2025-08-05 DIAGNOSIS — I10 PRIMARY HYPERTENSION: ICD-10-CM

## 2025-08-05 DIAGNOSIS — G47.30 SLEEP APNEA, UNSPECIFIED TYPE: ICD-10-CM

## 2025-08-05 DIAGNOSIS — E11.9 TYPE 2 DIABETES MELLITUS WITHOUT COMPLICATION, WITHOUT LONG-TERM CURRENT USE OF INSULIN: Primary | ICD-10-CM

## 2025-08-05 DIAGNOSIS — E11.9 TYPE 2 DIABETES MELLITUS WITHOUT COMPLICATION, WITHOUT LONG-TERM CURRENT USE OF INSULIN: ICD-10-CM

## 2025-08-05 LAB
BASOPHILS # BLD AUTO: 0.05 10*3/MM3 (ref 0–0.2)
BASOPHILS NFR BLD AUTO: 0.7 % (ref 0–1.5)
DEPRECATED RDW RBC AUTO: 42.4 FL (ref 37–54)
EOSINOPHIL # BLD AUTO: 0.26 10*3/MM3 (ref 0–0.4)
EOSINOPHIL NFR BLD AUTO: 3.4 % (ref 0.3–6.2)
ERYTHROCYTE [DISTWIDTH] IN BLOOD BY AUTOMATED COUNT: 12.6 % (ref 12.3–15.4)
HBA1C MFR BLD: 5.4 % (ref 4.8–5.6)
HCT VFR BLD AUTO: 31.4 % (ref 34–46.6)
HGB BLD-MCNC: 10.4 G/DL (ref 12–15.9)
IMM GRANULOCYTES # BLD AUTO: 0.02 10*3/MM3 (ref 0–0.05)
IMM GRANULOCYTES NFR BLD AUTO: 0.3 % (ref 0–0.5)
LYMPHOCYTES # BLD AUTO: 1.83 10*3/MM3 (ref 0.7–3.1)
LYMPHOCYTES NFR BLD AUTO: 24.2 % (ref 19.6–45.3)
MCH RBC QN AUTO: 30.3 PG (ref 26.6–33)
MCHC RBC AUTO-ENTMCNC: 33.1 G/DL (ref 31.5–35.7)
MCV RBC AUTO: 91.5 FL (ref 79–97)
MONOCYTES # BLD AUTO: 0.47 10*3/MM3 (ref 0.1–0.9)
MONOCYTES NFR BLD AUTO: 6.2 % (ref 5–12)
NEUTROPHILS NFR BLD AUTO: 4.92 10*3/MM3 (ref 1.7–7)
NEUTROPHILS NFR BLD AUTO: 65.2 % (ref 42.7–76)
NRBC BLD AUTO-RTO: 0 /100 WBC (ref 0–0.2)
PLATELET # BLD AUTO: 253 10*3/MM3 (ref 140–450)
PMV BLD AUTO: 11.2 FL (ref 6–12)
RBC # BLD AUTO: 3.43 10*6/MM3 (ref 3.77–5.28)
WBC NRBC COR # BLD AUTO: 7.55 10*3/MM3 (ref 3.4–10.8)

## 2025-08-05 PROCEDURE — 83036 HEMOGLOBIN GLYCOSYLATED A1C: CPT

## 2025-08-05 PROCEDURE — G0432 EIA HIV-1/HIV-2 SCREEN: HCPCS

## 2025-08-05 PROCEDURE — 85652 RBC SED RATE AUTOMATED: CPT

## 2025-08-05 PROCEDURE — 85025 COMPLETE CBC W/AUTO DIFF WBC: CPT

## 2025-08-05 PROCEDURE — 99214 OFFICE O/P EST MOD 30 MIN: CPT | Performed by: FAMILY MEDICINE

## 2025-08-05 PROCEDURE — 36415 COLL VENOUS BLD VENIPUNCTURE: CPT

## 2025-08-05 RX ORDER — LISINOPRIL 10 MG/1
10 TABLET ORAL DAILY
Qty: 90 TABLET | Refills: 1 | Status: SHIPPED | OUTPATIENT
Start: 2025-08-05

## 2025-08-05 RX ORDER — ATORVASTATIN CALCIUM 40 MG/1
40 TABLET, FILM COATED ORAL DAILY
Qty: 90 TABLET | Refills: 1 | Status: SHIPPED | OUTPATIENT
Start: 2025-08-05

## 2025-08-06 LAB
ERYTHROCYTE [SEDIMENTATION RATE] IN BLOOD: 8 MM/HR (ref 0–30)
HIV 1+2 AB+HIV1 P24 AG SERPL QL IA: NORMAL

## 2025-08-17 ENCOUNTER — HOSPITAL ENCOUNTER (OUTPATIENT)
Facility: HOSPITAL | Age: 60
Discharge: HOME | End: 2025-08-17
Admitting: FAMILY MEDICINE
Payer: COMMERCIAL

## 2025-08-17 DIAGNOSIS — R63.4 UNEXPLAINED WEIGHT LOSS: ICD-10-CM

## 2025-08-17 DIAGNOSIS — R40.4 EPISODE OF ALTERED CONSCIOUSNESS: ICD-10-CM

## 2025-08-17 PROCEDURE — 70450 CT HEAD/BRAIN W/O DYE: CPT

## 2025-08-19 ENCOUNTER — HOSPITAL ENCOUNTER (OUTPATIENT)
Dept: ULTRASOUND IMAGING | Facility: HOSPITAL | Age: 60
Discharge: HOME | End: 2025-08-19
Payer: COMMERCIAL

## 2025-08-19 DIAGNOSIS — E61.1 IRON DEFICIENCY: ICD-10-CM

## 2025-08-19 PROCEDURE — 76775 US EXAM ABDO BACK WALL LIM: CPT

## 2025-08-27 ENCOUNTER — TELEPHONE (OUTPATIENT)
Dept: PAIN MEDICINE | Facility: CLINIC | Age: 60
End: 2025-08-27
Payer: COMMERCIAL

## 2025-08-27 DIAGNOSIS — M53.3 PAIN OF LEFT SACROILIAC JOINT: Primary | ICD-10-CM
